# Patient Record
Sex: MALE | Race: WHITE | Employment: UNEMPLOYED | RURAL
[De-identification: names, ages, dates, MRNs, and addresses within clinical notes are randomized per-mention and may not be internally consistent; named-entity substitution may affect disease eponyms.]

---

## 2017-01-23 ENCOUNTER — HOSPITAL ENCOUNTER (OUTPATIENT)
Dept: OTHER | Age: 52
Discharge: HOME | End: 2017-01-23
Attending: INTERNAL MEDICINE | Admitting: INTERNAL MEDICINE

## 2017-01-25 LAB — TESTOSTERONE TOTAL-MALE: 258 NG/DL (ref 300–890)

## 2017-02-01 ENCOUNTER — HOSPITAL ENCOUNTER (OUTPATIENT)
Dept: MRI IMAGING | Age: 52
Discharge: HOME | End: 2017-02-01
Attending: PAIN MEDICINE | Admitting: PAIN MEDICINE

## 2017-02-01 DIAGNOSIS — M54.12 RADICULOPATHY, CERVICAL: ICD-10-CM

## 2017-02-01 DIAGNOSIS — M54.12 RADICULOPATHY OF CERVICAL REGION: ICD-10-CM

## 2019-01-14 NOTE — PROGRESS NOTES
Encounter Date:01/17/2019    [facility]    Patient ID: Charly Braun is a 53 y.o. male    1965  Subjective:      Chief Complaint/Reason for visit:    Chief Complaint   Patient presents with   • Chest Pain   • Irregular Heart Beat       Problem List:  1. Palpitations  2. Hypertension  3. GERD  4. Depression    HPI:  Patient is a pleasant 53-year-old male with the above-noted medical history presents today for further evaluation of his palpitations.  He states that he has had intermittent palpitations for majority of his life but they are typically brief in duration and very random.  A few weeks ago, he began noticing worsening palpitations at night lasting longer in duration up to several hours.  He cannot pinpoint any obvious trigger.  He states they occur consistently over a couple of days and then he has not experienced any in the past week.  He had labs performed by his primary which included normal magnesium and TSH levels.  He also states that his primary care had originally set him up for a sleep evaluation but could not afford the cost.  He states that he does not sleep well at night.  He has random intermittent chest pains that occur without trigger lasting seconds in duration.  He does have a family history of early onset coronary disease with 3 of his brothers and his father.  He reports having normal stress test and echocardiogram 20+ years ago.  He states when he is active he does not have any chest pain or shortness of breath that is limiting to daily life.  He has never been a smoker and he does not have diabetes or hyperlipidemia.  Since his palpitations seem to come in spurts, we think a monitor would be the patient's best option to determine what kind of rhythm and he is experiencing.  In order has been written and given to him and he will present to [facility] when they should occur again.        Cardiac ROS:  Positive for palpitations and snoring,  random chest pain.  Negative shortness of breath, dyspnea on exertion, orthopnea, PND, fatigue, edema, dizziness, pre-syncope/ syncope, BRYNN    Cardiac Risk Factors: family history of premature cardiovascular disease, hypertension, male gender, obesity (BMI >= 30 kg/m2) and sedentary lifestyle  Social History     Socioeconomic History   • Marital status:      Spouse name: Not on file   • Number of children: Not on file   • Years of education: Not on file   • Highest education level: Not on file   Social Needs   • Financial resource strain: Not on file   • Food insecurity - worry: Not on file   • Food insecurity - inability: Not on file   • Transportation needs - medical: Not on file   • Transportation needs - non-medical: Not on file   Occupational History   • Not on file   Tobacco Use   • Smoking status: Current Some Day Smoker   • Smokeless tobacco: Never Used   Substance and Sexual Activity   • Alcohol use: No     Frequency: Never   • Drug use: No   • Sexual activity: Defer   Other Topics Concern   • Not on file   Social History Narrative   • Not on file       family history includes COPD in his father; Hypertension in his mother.     has a past medical history of Acid reflux, Arthritis, Depression, Essential hypertension (1/17/2019), and Hypertension.    No Known Allergies      Current Outpatient Medications:   •  FLUoxetine (PROzac) 40 MG capsule, Take 40 mg by mouth Daily., Disp: , Rfl:   •  fluticasone (FLONASE) 50 MCG/ACT nasal spray, 2 sprays into the nostril(s) as directed by provider Daily., Disp: , Rfl:   •  HYDROcodone-acetaminophen (NORCO) 7.5-325 MG per tablet, Take 1 tablet by mouth Every 6 (Six) Hours As Needed for Moderate Pain ., Disp: , Rfl:   •  lisinopril (PRINIVIL,ZESTRIL) 10 MG tablet, Take 10 mg by mouth Daily., Disp: , Rfl:   •  pantoprazole (PROTONIX) 20 MG EC tablet, Take 20 mg by mouth Daily., Disp: , Rfl:     Review of Systems   Constitution: Negative.   HENT: Negative.    Eyes:  Negative.    Cardiovascular: Positive for chest pain and palpitations.   Respiratory: Positive for snoring.    Endocrine: Negative.    Hematologic/Lymphatic: Negative.    Skin: Negative.    Musculoskeletal: Negative.    Gastrointestinal: Positive for heartburn.   Genitourinary: Negative.    Neurological: Negative.    Psychiatric/Behavioral: Negative.    Allergic/Immunologic: Negative.        Vitals:    01/17/19 1205   BP: 118/62   Pulse: 77          Objective:       Physical Exam   Constitutional: He is oriented to person, place, and time. He appears well-developed and well-nourished.   HENT:   Head: Normocephalic and atraumatic.   Eyes: Pupils are equal, round, and reactive to light. Right eye exhibits no discharge. Left eye exhibits no discharge.   Neck: Normal range of motion. Neck supple. No JVD present.   Cardiovascular: Normal rate, regular rhythm, normal heart sounds and intact distal pulses. Exam reveals no gallop and no friction rub.   No murmur heard.  Pulmonary/Chest: Effort normal and breath sounds normal. He has no wheezes.   Abdominal: Soft. Bowel sounds are normal. There is no rebound.   Musculoskeletal: Normal range of motion. He exhibits no edema.   Neurological: He is alert and oriented to person, place, and time.   Skin: Skin is warm and dry.   Psychiatric: He has a normal mood and affect. His behavior is normal.       Data Review:     ECG 12 Lead  Date/Time: 1/17/2019 1:02 PM  Performed by: Angela Torres APRN  Authorized by: Angela Torres APRN   Comparison: not compared with previous ECG   Previous ECG: no previous ECG available  Rhythm: sinus rhythm  Rate: normal  BPM: 77  Comments: Nonspecific T-wave abnormality  Borderline EKG        Assessment:      ICD-10-CM ICD-9-CM   1. Palpitations R00.2 785.1   2. Essential hypertension I10 401.9       Plan:  48 hour holter monitor.  Order given.  When palpitations return, he will come to asim and [facility] with his order to have it placed.    Continue current medications  F/up in 6 weeks or sooner if needed.  Can push out further if monitor has not been worn yet.          Scribed for Karen Garcia MD by MARGOT Lara. 1/17/2019  1:05 PM     I Karen Garcia MD personally performed the services described in this documentation as scribed by the above individual in my presence, and it is both accurate and complete.    Karen Garcia MD, FACC

## 2019-01-17 ENCOUNTER — CONSULT (OUTPATIENT)
Dept: CARDIOLOGY | Facility: CLINIC | Age: 54
End: 2019-01-17

## 2019-01-17 ENCOUNTER — HOSPITAL ENCOUNTER (OUTPATIENT)
Facility: HOSPITAL | Age: 54
Discharge: HOME | End: 2019-01-17
Payer: MEDICARE

## 2019-01-17 VITALS
DIASTOLIC BLOOD PRESSURE: 62 MMHG | HEART RATE: 77 BPM | BODY MASS INDEX: 35.84 KG/M2 | SYSTOLIC BLOOD PRESSURE: 118 MMHG | HEIGHT: 71 IN | WEIGHT: 256 LBS

## 2019-01-17 DIAGNOSIS — R00.2 PALPITATIONS: Primary | ICD-10-CM

## 2019-01-17 DIAGNOSIS — I10 ESSENTIAL HYPERTENSION: ICD-10-CM

## 2019-01-17 PROCEDURE — 93000 ELECTROCARDIOGRAM COMPLETE: CPT | Performed by: INTERNAL MEDICINE

## 2019-01-17 PROCEDURE — 99204 OFFICE O/P NEW MOD 45 MIN: CPT | Performed by: INTERNAL MEDICINE

## 2019-01-17 PROCEDURE — 93005 ELECTROCARDIOGRAM TRACING: CPT

## 2019-01-17 RX ORDER — HYDROCODONE BITARTRATE AND ACETAMINOPHEN 7.5; 325 MG/1; MG/1
1 TABLET ORAL EVERY 6 HOURS PRN
COMMUNITY

## 2019-01-17 RX ORDER — FLUOXETINE HYDROCHLORIDE 40 MG/1
40 CAPSULE ORAL DAILY
COMMUNITY

## 2019-01-17 RX ORDER — PANTOPRAZOLE SODIUM 20 MG/1
20 TABLET, DELAYED RELEASE ORAL DAILY
COMMUNITY
End: 2022-09-15

## 2019-01-17 RX ORDER — FLUTICASONE PROPIONATE 50 MCG
2 SPRAY, SUSPENSION (ML) NASAL DAILY
COMMUNITY
End: 2022-03-24 | Stop reason: SDUPTHER

## 2019-01-17 RX ORDER — LISINOPRIL 10 MG/1
10 TABLET ORAL DAILY
COMMUNITY
End: 2022-09-15

## 2019-06-06 ENCOUNTER — OFFICE VISIT (OUTPATIENT)
Dept: CARDIOLOGY | Facility: CLINIC | Age: 54
End: 2019-06-06

## 2019-06-06 ENCOUNTER — HOSPITAL ENCOUNTER (OUTPATIENT)
Facility: HOSPITAL | Age: 54
Discharge: HOME | End: 2019-06-06
Payer: MEDICARE

## 2019-06-06 VITALS
WEIGHT: 257 LBS | HEIGHT: 71 IN | SYSTOLIC BLOOD PRESSURE: 146 MMHG | HEART RATE: 82 BPM | BODY MASS INDEX: 35.98 KG/M2 | DIASTOLIC BLOOD PRESSURE: 88 MMHG

## 2019-06-06 DIAGNOSIS — R00.2 PALPITATIONS: Primary | ICD-10-CM

## 2019-06-06 DIAGNOSIS — I10 ESSENTIAL HYPERTENSION: ICD-10-CM

## 2019-06-06 PROCEDURE — 99214 OFFICE O/P EST MOD 30 MIN: CPT | Performed by: INTERNAL MEDICINE

## 2019-06-06 PROCEDURE — 93225 XTRNL ECG REC<48 HRS REC: CPT

## 2019-06-06 PROCEDURE — 93226 XTRNL ECG REC<48 HR SCAN A/R: CPT

## 2019-06-06 RX ORDER — LORATADINE 10 MG/1
10 TABLET ORAL DAILY
Refills: 0 | COMMUNITY
Start: 2019-06-03 | End: 2022-09-15

## 2019-06-06 RX ORDER — IBUPROFEN 800 MG/1
800 TABLET ORAL EVERY 6 HOURS PRN
Refills: 0 | COMMUNITY
Start: 2019-06-03

## 2019-06-06 RX ORDER — PREDNISONE 10 MG/1
TABLET ORAL
Refills: 0 | COMMUNITY
Start: 2019-06-03 | End: 2019-06-13

## 2019-06-06 RX ORDER — GABAPENTIN 300 MG/1
300 CAPSULE ORAL AS NEEDED
Refills: 0 | Status: ON HOLD | COMMUNITY
Start: 2019-05-29 | End: 2019-06-24

## 2019-06-06 RX ORDER — AMOXICILLIN 500 MG/1
500 CAPSULE ORAL 3 TIMES DAILY
Refills: 0 | COMMUNITY
Start: 2019-06-03 | End: 2019-06-13

## 2019-06-12 ENCOUNTER — TELEPHONE (OUTPATIENT)
Dept: CARDIOLOGY | Facility: CLINIC | Age: 54
End: 2019-06-12

## 2019-06-12 NOTE — TELEPHONE ENCOUNTER
Lm on answering machine to call me back to go over monitor results- also left msg on spouses vm to have him call me as soon as possible    He needs an echo and either stress test or LHC- will await their returncall

## 2019-06-13 NOTE — TELEPHONE ENCOUNTER
Left another message on pt's answering machine to call me back to go over recent holter monitor. If no answer tomorrow, I will send him a letter.

## 2019-06-14 DIAGNOSIS — I47.20 V-TACH (HCC): Primary | ICD-10-CM

## 2019-06-14 NOTE — TELEPHONE ENCOUNTER
I talked with PT's wife and she is going to try to reach the patient to have him call me back- will await his return phone call

## 2019-06-14 NOTE — TELEPHONE ENCOUNTER
D/w pt and he would liek to proceed with [facility] over stress test- orders placed-

## 2019-06-18 ENCOUNTER — PREP FOR SURGERY (OUTPATIENT)
Dept: OTHER | Facility: HOSPITAL | Age: 54
End: 2019-06-18

## 2019-06-18 DIAGNOSIS — R00.2 PALPITATIONS: Primary | ICD-10-CM

## 2019-06-18 DIAGNOSIS — I47.20 V-TACH (HCC): Primary | ICD-10-CM

## 2019-06-18 RX ORDER — ASPIRIN 325 MG
325 TABLET ORAL ONCE
Status: CANCELLED | OUTPATIENT
Start: 2019-06-18 | End: 2019-06-18

## 2019-06-18 RX ORDER — ONDANSETRON 2 MG/ML
4 INJECTION INTRAMUSCULAR; INTRAVENOUS EVERY 6 HOURS PRN
Status: CANCELLED | OUTPATIENT
Start: 2019-06-18

## 2019-06-18 RX ORDER — ASPIRIN 325 MG
325 TABLET, DELAYED RELEASE (ENTERIC COATED) ORAL DAILY
Status: CANCELLED | OUTPATIENT
Start: 2019-06-19

## 2019-06-18 RX ORDER — SODIUM CHLORIDE 0.9 % (FLUSH) 0.9 %
3 SYRINGE (ML) INJECTION EVERY 12 HOURS SCHEDULED
Status: CANCELLED | OUTPATIENT
Start: 2019-06-18

## 2019-06-18 RX ORDER — NITROGLYCERIN 0.4 MG/1
0.4 TABLET SUBLINGUAL
Status: CANCELLED | OUTPATIENT
Start: 2019-06-18

## 2019-06-18 RX ORDER — SODIUM CHLORIDE 0.9 % (FLUSH) 0.9 %
1-10 SYRINGE (ML) INJECTION AS NEEDED
Status: CANCELLED | OUTPATIENT
Start: 2019-06-18

## 2019-06-24 ENCOUNTER — HOSPITAL ENCOUNTER (OUTPATIENT)
Facility: HOSPITAL | Age: 54
Discharge: HOME | End: 2019-06-24
Attending: INTERNAL MEDICINE | Admitting: INTERNAL MEDICINE

## 2019-06-24 ENCOUNTER — APPOINTMENT (OUTPATIENT)
Dept: CARDIOLOGY | Facility: HOSPITAL | Age: 54
End: 2019-06-24

## 2019-06-24 VITALS
RESPIRATION RATE: 16 BRPM | HEART RATE: 64 BPM | DIASTOLIC BLOOD PRESSURE: 80 MMHG | BODY MASS INDEX: 34.35 KG/M2 | HEIGHT: 71 IN | WEIGHT: 245.37 LBS | OXYGEN SATURATION: 94 % | TEMPERATURE: 97.7 F | SYSTOLIC BLOOD PRESSURE: 127 MMHG

## 2019-06-24 DIAGNOSIS — I47.20 V-TACH (HCC): ICD-10-CM

## 2019-06-24 LAB
ALBUMIN SERPL-MCNC: 4.4 G/DL (ref 3.5–5.2)
ALBUMIN/GLOB SERPL: 1.5 G/DL
ALP SERPL-CCNC: 52 U/L (ref 39–117)
ALT SERPL W P-5'-P-CCNC: 87 U/L (ref 1–41)
ANION GAP SERPL CALCULATED.3IONS-SCNC: 15 MMOL/L
AST SERPL-CCNC: 54 U/L (ref 1–40)
BH CV ECHO MEAS - AO ROOT AREA (BSA CORRECTED): 1.5
BH CV ECHO MEAS - AO ROOT AREA: 9.7 CM^2
BH CV ECHO MEAS - AO ROOT DIAM: 3.5 CM
BH CV ECHO MEAS - ASC AORTA: 3.1 CM
BH CV ECHO MEAS - BSA(HAYCOCK): 2.4 M^2
BH CV ECHO MEAS - BSA: 2.3 M^2
BH CV ECHO MEAS - BZI_BMI: 34.2 KILOGRAMS/M^2
BH CV ECHO MEAS - BZI_METRIC_HEIGHT: 180.3 CM
BH CV ECHO MEAS - BZI_METRIC_WEIGHT: 111.1 KG
BH CV ECHO MEAS - EDV(CUBED): 151.8 ML
BH CV ECHO MEAS - EDV(MOD-SP2): 58 ML
BH CV ECHO MEAS - EDV(MOD-SP4): 77 ML
BH CV ECHO MEAS - EDV(TEICH): 137.4 ML
BH CV ECHO MEAS - EF(CUBED): 75.4 %
BH CV ECHO MEAS - EF(MOD-BP): 63 %
BH CV ECHO MEAS - EF(MOD-SP2): 62.1 %
BH CV ECHO MEAS - EF(MOD-SP4): 61 %
BH CV ECHO MEAS - EF(TEICH): 66.9 %
BH CV ECHO MEAS - ESV(CUBED): 37.3 ML
BH CV ECHO MEAS - ESV(MOD-SP2): 22 ML
BH CV ECHO MEAS - ESV(MOD-SP4): 30 ML
BH CV ECHO MEAS - ESV(TEICH): 45.5 ML
BH CV ECHO MEAS - FS: 37.4 %
BH CV ECHO MEAS - IVS/LVPW: 0.98
BH CV ECHO MEAS - IVSD: 1.2 CM
BH CV ECHO MEAS - LA DIMENSION: 3.6 CM
BH CV ECHO MEAS - LA/AO: 1
BH CV ECHO MEAS - LAD MAJOR: 4.9 CM
BH CV ECHO MEAS - LAT PEAK E' VEL: 9.1 CM/SEC
BH CV ECHO MEAS - LATERAL E/E' RATIO: 8.3
BH CV ECHO MEAS - LV DIASTOLIC VOL/BSA (35-75): 33.5 ML/M^2
BH CV ECHO MEAS - LV MASS(C)D: 271.9 GRAMS
BH CV ECHO MEAS - LV MASS(C)DI: 118.3 GRAMS/M^2
BH CV ECHO MEAS - LV SYSTOLIC VOL/BSA (12-30): 13 ML/M^2
BH CV ECHO MEAS - LVIDD: 5.3 CM
BH CV ECHO MEAS - LVIDS: 3.3 CM
BH CV ECHO MEAS - LVLD AP2: 7.9 CM
BH CV ECHO MEAS - LVLD AP4: 7.1 CM
BH CV ECHO MEAS - LVLS AP2: 6.9 CM
BH CV ECHO MEAS - LVLS AP4: 6.9 CM
BH CV ECHO MEAS - LVPWD: 1.3 CM
BH CV ECHO MEAS - MED PEAK E' VEL: 6.7 CM/SEC
BH CV ECHO MEAS - MEDIAL E/E' RATIO: 11.2
BH CV ECHO MEAS - MV A MAX VEL: 65.2 CM/SEC
BH CV ECHO MEAS - MV DEC SLOPE: 447 CM/SEC^2
BH CV ECHO MEAS - MV DEC TIME: 0.16 SEC
BH CV ECHO MEAS - MV E MAX VEL: 77 CM/SEC
BH CV ECHO MEAS - MV E/A: 1.2
BH CV ECHO MEAS - PA ACC SLOPE: 583.9 CM/SEC^2
BH CV ECHO MEAS - PA ACC TIME: 0.14 SEC
BH CV ECHO MEAS - PA PR(ACCEL): 18 MMHG
BH CV ECHO MEAS - RVDD: 3.1 CM
BH CV ECHO MEAS - SI(CUBED): 49.8 ML/M^2
BH CV ECHO MEAS - SI(MOD-SP2): 15.7 ML/M^2
BH CV ECHO MEAS - SI(MOD-SP4): 20.4 ML/M^2
BH CV ECHO MEAS - SI(TEICH): 40 ML/M^2
BH CV ECHO MEAS - SV(CUBED): 114.5 ML
BH CV ECHO MEAS - SV(MOD-SP2): 36 ML
BH CV ECHO MEAS - SV(MOD-SP4): 47 ML
BH CV ECHO MEAS - SV(TEICH): 91.9 ML
BH CV ECHO MEASUREMENTS AVERAGE E/E' RATIO: 9.75
BH CV VAS BP RIGHT ARM: NORMAL MMHG
BH CV XLRA - RV BASE: 2.9 CM
BH CV XLRA - RV LENGTH: 5.1 CM
BH CV XLRA - RV MID: 2.1 CM
BILIRUB SERPL-MCNC: 0.7 MG/DL (ref 0.2–1.2)
BUN BLD-MCNC: 14 MG/DL (ref 6–20)
BUN BLDA-MCNC: 16 MG/DL (ref 8–26)
BUN/CREAT SERPL: 14.1 (ref 7–25)
CA-I BLDA-SCNC: 1.16 MMOL/L (ref 1.2–1.32)
CALCIUM SPEC-SCNC: 9.4 MG/DL (ref 8.6–10.5)
CHLORIDE BLDA-SCNC: 104 MMOL/L (ref 98–109)
CHLORIDE SERPL-SCNC: 103 MMOL/L (ref 98–107)
CHOLEST SERPL-MCNC: 170 MG/DL (ref 0–200)
CO2 BLDA-SCNC: 28 MMOL/L (ref 24–29)
CO2 SERPL-SCNC: 24 MMOL/L (ref 22–29)
CREAT BLD-MCNC: 0.99 MG/DL (ref 0.76–1.27)
CREAT BLDA-MCNC: 1 MG/DL (ref 0.6–1.3)
DEPRECATED RDW RBC AUTO: 42.5 FL (ref 37–54)
ERYTHROCYTE [DISTWIDTH] IN BLOOD BY AUTOMATED COUNT: 11.9 % (ref 12.3–15.4)
GFR SERPL CREATININE-BSD FRML MDRD: 79 ML/MIN/1.73
GLOBULIN UR ELPH-MCNC: 2.9 GM/DL
GLUCOSE BLD-MCNC: 98 MG/DL (ref 65–99)
GLUCOSE BLDC GLUCOMTR-MCNC: 97 MG/DL (ref 70–130)
HBA1C MFR BLD: 5.4 % (ref 4.8–5.6)
HCT VFR BLD AUTO: 47.1 % (ref 37.5–51)
HCT VFR BLDA CALC: 40 % (ref 38–51)
HDLC SERPL-MCNC: 36 MG/DL (ref 40–60)
HGB BLD-MCNC: 15.5 G/DL (ref 13–17.7)
HGB BLDA-MCNC: 13.6 G/DL (ref 12–17)
LDLC SERPL CALC-MCNC: 98 MG/DL (ref 0–100)
LDLC/HDLC SERPL: 2.72 {RATIO}
LEFT ATRIUM VOLUME INDEX: 15.2 ML/M^2
LEFT ATRIUM VOLUME: 35 ML
LV EF 2D ECHO EST: 60 %
MAXIMAL PREDICTED HEART RATE: 168 BPM
MCH RBC QN AUTO: 31.6 PG (ref 26.6–33)
MCHC RBC AUTO-ENTMCNC: 32.9 G/DL (ref 31.5–35.7)
MCV RBC AUTO: 96.1 FL (ref 79–97)
PLATELET # BLD AUTO: 166 10*3/MM3 (ref 140–450)
PMV BLD AUTO: 9.7 FL (ref 6–12)
POTASSIUM BLD-SCNC: 3.8 MMOL/L (ref 3.5–5.2)
POTASSIUM BLDA-SCNC: 4.3 MMOL/L (ref 3.5–4.9)
PROT SERPL-MCNC: 7.3 G/DL (ref 6–8.5)
RBC # BLD AUTO: 4.9 10*6/MM3 (ref 4.14–5.8)
SODIUM BLD-SCNC: 142 MMOL/L (ref 136–145)
SODIUM BLDA-SCNC: 141 MMOL/L (ref 138–146)
STRESS TARGET HR: 143 BPM
TRIGL SERPL-MCNC: 181 MG/DL (ref 0–150)
VLDLC SERPL-MCNC: 36.2 MG/DL
WBC NRBC COR # BLD: 5.1 10*3/MM3 (ref 3.4–10.8)

## 2019-06-24 PROCEDURE — 93306 TTE W/DOPPLER COMPLETE: CPT

## 2019-06-24 PROCEDURE — 93458 L HRT ARTERY/VENTRICLE ANGIO: CPT | Performed by: INTERNAL MEDICINE

## 2019-06-24 PROCEDURE — 80047 BASIC METABLC PNL IONIZED CA: CPT

## 2019-06-24 PROCEDURE — 0 IOPAMIDOL PER 1 ML: Performed by: INTERNAL MEDICINE

## 2019-06-24 PROCEDURE — 25010000002 FENTANYL CITRATE (PF) 100 MCG/2ML SOLUTION: Performed by: INTERNAL MEDICINE

## 2019-06-24 PROCEDURE — 80061 LIPID PANEL: CPT | Performed by: NURSE PRACTITIONER

## 2019-06-24 PROCEDURE — 36415 COLL VENOUS BLD VENIPUNCTURE: CPT

## 2019-06-24 PROCEDURE — 85014 HEMATOCRIT: CPT

## 2019-06-24 PROCEDURE — C1769 GUIDE WIRE: HCPCS | Performed by: INTERNAL MEDICINE

## 2019-06-24 PROCEDURE — 93306 TTE W/DOPPLER COMPLETE: CPT | Performed by: INTERNAL MEDICINE

## 2019-06-24 PROCEDURE — C1894 INTRO/SHEATH, NON-LASER: HCPCS | Performed by: INTERNAL MEDICINE

## 2019-06-24 PROCEDURE — 85027 COMPLETE CBC AUTOMATED: CPT | Performed by: NURSE PRACTITIONER

## 2019-06-24 PROCEDURE — 80053 COMPREHEN METABOLIC PANEL: CPT | Performed by: NURSE PRACTITIONER

## 2019-06-24 PROCEDURE — 83036 HEMOGLOBIN GLYCOSYLATED A1C: CPT | Performed by: NURSE PRACTITIONER

## 2019-06-24 PROCEDURE — 25010000002 MIDAZOLAM PER 1 MG: Performed by: INTERNAL MEDICINE

## 2019-06-24 RX ORDER — ALPRAZOLAM 0.25 MG/1
0.25 TABLET ORAL 3 TIMES DAILY PRN
Status: DISCONTINUED | OUTPATIENT
Start: 2019-06-24 | End: 2019-06-24 | Stop reason: HOSPADM

## 2019-06-24 RX ORDER — SODIUM CHLORIDE 9 MG/ML
250 INJECTION, SOLUTION INTRAVENOUS ONCE AS NEEDED
Status: DISCONTINUED | OUTPATIENT
Start: 2019-06-24 | End: 2019-06-24 | Stop reason: HOSPADM

## 2019-06-24 RX ORDER — LIDOCAINE HYDROCHLORIDE 10 MG/ML
INJECTION, SOLUTION EPIDURAL; INFILTRATION; INTRACAUDAL; PERINEURAL AS NEEDED
Status: DISCONTINUED | OUTPATIENT
Start: 2019-06-24 | End: 2019-06-24 | Stop reason: HOSPADM

## 2019-06-24 RX ORDER — NITROGLYCERIN 0.4 MG/1
0.4 TABLET SUBLINGUAL
Status: DISCONTINUED | OUTPATIENT
Start: 2019-06-24 | End: 2019-06-24 | Stop reason: HOSPADM

## 2019-06-24 RX ORDER — FENTANYL CITRATE 50 UG/ML
INJECTION, SOLUTION INTRAMUSCULAR; INTRAVENOUS AS NEEDED
Status: DISCONTINUED | OUTPATIENT
Start: 2019-06-24 | End: 2019-06-24 | Stop reason: HOSPADM

## 2019-06-24 RX ORDER — HYDROCODONE BITARTRATE AND ACETAMINOPHEN 7.5; 325 MG/1; MG/1
1 TABLET ORAL EVERY 4 HOURS PRN
Status: DISCONTINUED | OUTPATIENT
Start: 2019-06-24 | End: 2019-06-24 | Stop reason: HOSPADM

## 2019-06-24 RX ORDER — SODIUM CHLORIDE 0.9 % (FLUSH) 0.9 %
3 SYRINGE (ML) INJECTION EVERY 12 HOURS SCHEDULED
Status: DISCONTINUED | OUTPATIENT
Start: 2019-06-24 | End: 2019-06-24 | Stop reason: HOSPADM

## 2019-06-24 RX ORDER — SODIUM CHLORIDE 0.9 % (FLUSH) 0.9 %
1-10 SYRINGE (ML) INJECTION AS NEEDED
Status: DISCONTINUED | OUTPATIENT
Start: 2019-06-24 | End: 2019-06-24 | Stop reason: HOSPADM

## 2019-06-24 RX ORDER — ONDANSETRON 2 MG/ML
4 INJECTION INTRAMUSCULAR; INTRAVENOUS EVERY 6 HOURS PRN
Status: DISCONTINUED | OUTPATIENT
Start: 2019-06-24 | End: 2019-06-24 | Stop reason: HOSPADM

## 2019-06-24 RX ORDER — MIDAZOLAM HYDROCHLORIDE 1 MG/ML
INJECTION INTRAMUSCULAR; INTRAVENOUS AS NEEDED
Status: DISCONTINUED | OUTPATIENT
Start: 2019-06-24 | End: 2019-06-24 | Stop reason: HOSPADM

## 2019-06-24 RX ORDER — ACETAMINOPHEN 325 MG/1
650 TABLET ORAL EVERY 4 HOURS PRN
Status: DISCONTINUED | OUTPATIENT
Start: 2019-06-24 | End: 2019-06-24 | Stop reason: HOSPADM

## 2019-06-24 RX ORDER — BISOPROLOL FUMARATE 5 MG/1
5 TABLET, FILM COATED ORAL DAILY
Qty: 30 TABLET | Refills: 11 | Status: SHIPPED | OUTPATIENT
Start: 2019-06-24 | End: 2020-03-05 | Stop reason: SDUPTHER

## 2019-06-24 RX ORDER — NALOXONE HCL 0.4 MG/ML
0.4 VIAL (ML) INJECTION
Status: DISCONTINUED | OUTPATIENT
Start: 2019-06-24 | End: 2019-06-24 | Stop reason: HOSPADM

## 2019-06-24 RX ORDER — ASPIRIN 325 MG
325 TABLET ORAL ONCE
Status: COMPLETED | OUTPATIENT
Start: 2019-06-24 | End: 2019-06-24

## 2019-06-24 RX ORDER — SODIUM CHLORIDE 9 MG/ML
330 INJECTION, SOLUTION INTRAVENOUS CONTINUOUS
Status: ACTIVE | OUTPATIENT
Start: 2019-06-24 | End: 2019-06-24

## 2019-06-24 RX ORDER — SODIUM CHLORIDE 9 MG/ML
100 INJECTION, SOLUTION INTRAVENOUS CONTINUOUS
Status: ACTIVE | OUTPATIENT
Start: 2019-06-24 | End: 2019-06-24

## 2019-06-24 RX ORDER — ASPIRIN 325 MG
325 TABLET, DELAYED RELEASE (ENTERIC COATED) ORAL DAILY
Status: DISCONTINUED | OUTPATIENT
Start: 2019-06-25 | End: 2019-06-24 | Stop reason: HOSPADM

## 2019-06-24 RX ORDER — MORPHINE SULFATE 2 MG/ML
1 INJECTION, SOLUTION INTRAMUSCULAR; INTRAVENOUS EVERY 4 HOURS PRN
Status: DISCONTINUED | OUTPATIENT
Start: 2019-06-24 | End: 2019-06-24 | Stop reason: HOSPADM

## 2019-06-24 RX ADMIN — ASPIRIN 325 MG ORAL TABLET 325 MG: 325 PILL ORAL at 08:01

## 2019-06-24 RX ADMIN — SODIUM CHLORIDE 330 ML/HR: 9 INJECTION, SOLUTION INTRAVENOUS at 08:01

## 2019-06-24 NOTE — INTERVAL H&P NOTE
H&P updated. The patient was examined and the following changes are noted:  Multiple PVCs were seen as separate as well as several runs of brief nonsustained VT the longest of which was 6 beats.  The patient now presents for left heart catheterization with possible intervention.  The right radial pulse as well as the ulnar pulse is good.  The patient has no upcoming surgeries.  The patient understands the risks and benefits of the procedure and agrees to proceed.  A sleep study will also be pursued as well as an echocardiogram.Karen Garcia MD, FACC

## 2019-08-15 ENCOUNTER — OFFICE VISIT (OUTPATIENT)
Dept: CARDIOLOGY | Facility: CLINIC | Age: 54
End: 2019-08-15

## 2019-08-15 VITALS
WEIGHT: 253 LBS | BODY MASS INDEX: 35.42 KG/M2 | SYSTOLIC BLOOD PRESSURE: 122 MMHG | HEIGHT: 71 IN | DIASTOLIC BLOOD PRESSURE: 78 MMHG | HEART RATE: 74 BPM

## 2019-08-15 DIAGNOSIS — R00.2 PALPITATIONS: Primary | ICD-10-CM

## 2019-08-15 DIAGNOSIS — I10 ESSENTIAL HYPERTENSION: ICD-10-CM

## 2019-08-15 PROCEDURE — 99214 OFFICE O/P EST MOD 30 MIN: CPT | Performed by: NURSE PRACTITIONER

## 2019-08-15 NOTE — PROGRESS NOTES
Charly Braun  1965  54 y.o.  [phone]  Mobile phone not available      Date: 08/15/2019    PCP: [facility] Foster APRN    Chief Complaint   Patient presents with   • Palpitations       Problem List:  1. Palpitation:  a. 48h Holter, 06/10/2019: Rare PAC's, occasional PVC's, one 6-beat run of VT.  b. Normal LHC, 06/24/2019.  c. Echo, 06/24/2019: EF 60%. Trace-to-mild MR/TR.  2. Hypertension  3. GERD  4. Depression      No Known Allergies    Current Medications:      Current Outpatient Medications:   •  bisoprolol (ZEBETA) 5 MG tablet, Take 1 tablet by mouth Daily., Disp: 30 tablet, Rfl: 11  •  FLUoxetine (PROzac) 40 MG capsule, Take 40 mg by mouth Daily., Disp: , Rfl:   •  fluticasone (FLONASE) 50 MCG/ACT nasal spray, 2 sprays into the nostril(s) as directed by provider Daily., Disp: , Rfl:   •  HYDROcodone-acetaminophen (NORCO) 7.5-325 MG per tablet, Take 1 tablet by mouth Every 6 (Six) Hours As Needed for Moderate Pain ., Disp: , Rfl:   •  ibuprofen (ADVIL,MOTRIN) 800 MG tablet, Take 800 mg by mouth As Needed., Disp: , Rfl: 0  •  lisinopril (PRINIVIL,ZESTRIL) 10 MG tablet, Take 10 mg by mouth Daily., Disp: , Rfl:   •  loratadine (CLARITIN) 10 MG tablet, Take 10 mg by mouth Daily., Disp: , Rfl: 0  •  pantoprazole (PROTONIX) 20 MG EC tablet, Take 20 mg by mouth Daily., Disp: , Rfl:     HPI    Charly Braun is a 54 y.o. male who presents today for 1 month hospital follow up s/p cath with a history of palpitations and hypertension. Since last visit, he has been doing well from the cardiac standpoint.  He states since adding bisoprolol to his medical therapy his palpitations have nearly stopped completely.  He denies having any chest pain, shortness of breath, dyspnea on exertion, edema, fatigue, dizziness and syncope.  Overall he is doing much better from a cardiac standpoint since his normal cardiac catheterization and medication adjustment.  He does note that he forgot to get his initial sleep study  "duration and has rescheduled that appointment.      The following portions of the patient's history were reviewed and updated as appropriate: allergies, current medications and problem list.    Pertinent positives as listed in the HPI.  All other systems reviewed are negative.    Vitals:    08/15/19 1522   BP: 122/78   BP Location: Right arm   Patient Position: Sitting   Pulse: 74   Weight: 115 kg (253 lb)   Height: 180.3 cm (71\")       Physical Exam:    GENERAL: well-developed, well-nourished; in no acute distress.   NECK:  Carotid upstrokes are 2+ and  symmetrical without bruits.   LUNGS: Clear to auscultation bilaterally without wheezing, rhonchi, or rales noted.   CARDIOVASCULAR: The heart has a regular rate with a normal S1 and S2. There is no murmur, gallop, rub, or click appreciated. The PMI is nondisplaced.   NEUROLOGICAL: Nonfocal; Alert and oriented  PERIPHERAL VASCULAR:  Posterior tibial pulses are 2+ and symmetrical. There is no peripheral edema.   SKIN:  Warm and dry  PSYCHIATRIC: normal mood and affect; behavior appropriate      Diagnostic Data:  Lab Results   Component Value Date    GLUCOSE 98 06/24/2019    BUN 14 06/24/2019    CREATININE 1.00 06/24/2019    EGFRIFNONA 79 06/24/2019    BCR 14.1 06/24/2019     06/24/2019    K 3.8 06/24/2019     06/24/2019    CO2 24.0 06/24/2019    CALCIUM 9.4 06/24/2019    ALBUMIN 4.40 06/24/2019    AST 54 (H) 06/24/2019    ALT 87 (H) 06/24/2019     Lab Results   Component Value Date    CHOL 170 06/24/2019    TRIG 181 (H) 06/24/2019    HDL 36 (L) 06/24/2019    LDL 98 06/24/2019      Lab Results   Component Value Date    WBC 5.10 06/24/2019    HGB 13.6 06/24/2019    HCT 40 06/24/2019    MCV 96.1 06/24/2019     06/24/2019     No results found for: TSH    Procedures    Assessment:      ICD-10-CM ICD-9-CM   1. Palpitations R00.2 785.1   2. Essential hypertension I10 401.9     Lab results found above were reviewed with the " patient.    Plan:    1. Encouraged routine exercise and dietary modifications  2. Continue bisoprolol for hypertension and palpitations  3. Continue all other current medications.  4. F/up in 6 months or sooner if needed.    Seen independently by MARGOT Lara on  8/16/2019  10:33 PM

## 2019-08-31 ENCOUNTER — HOSPITAL ENCOUNTER (EMERGENCY)
Facility: HOSPITAL | Age: 54
Discharge: HOME | End: 2019-08-31
Attending: EMERGENCY MEDICINE
Payer: MEDICARE

## 2019-08-31 VITALS
SYSTOLIC BLOOD PRESSURE: 121 MMHG | RESPIRATION RATE: 16 BRPM | BODY MASS INDEX: 34.27 KG/M2 | HEART RATE: 77 BPM | TEMPERATURE: 98.7 F | DIASTOLIC BLOOD PRESSURE: 77 MMHG | HEIGHT: 72 IN | WEIGHT: 253 LBS | OXYGEN SATURATION: 96 %

## 2019-08-31 DIAGNOSIS — L02.91 ABSCESS: Primary | ICD-10-CM

## 2019-08-31 PROCEDURE — 99282 EMERGENCY DEPT VISIT SF MDM: CPT

## 2019-08-31 RX ORDER — CLINDAMYCIN HYDROCHLORIDE 300 MG/1
300 CAPSULE ORAL 3 TIMES DAILY
Qty: 30 CAPSULE | Refills: 0 | Status: SHIPPED | OUTPATIENT
Start: 2019-08-31 | End: 2019-09-10

## 2019-08-31 ASSESSMENT — PAIN DESCRIPTION - DESCRIPTORS: DESCRIPTORS: THROBBING

## 2019-08-31 ASSESSMENT — ENCOUNTER SYMPTOMS
ABDOMINAL PAIN: 0
EYE REDNESS: 0
SORE THROAT: 0
ROS SKIN COMMENTS: ABSCESS.
SHORTNESS OF BREATH: 0
EYE PAIN: 0
CONSTIPATION: 0
EYE DISCHARGE: 0
VOMITING: 0
RHINORRHEA: 0
COUGH: 0
TROUBLE SWALLOWING: 0
BACK PAIN: 0
DIARRHEA: 0
SINUS PRESSURE: 0
CHEST TIGHTNESS: 0
NAUSEA: 0
WHEEZING: 0

## 2019-08-31 ASSESSMENT — PAIN DESCRIPTION - PAIN TYPE: TYPE: ACUTE PAIN

## 2019-08-31 ASSESSMENT — PAIN DESCRIPTION - LOCATION: LOCATION: BACK;SHOULDER

## 2019-08-31 ASSESSMENT — PAIN DESCRIPTION - ORIENTATION: ORIENTATION: LEFT;UPPER

## 2019-08-31 ASSESSMENT — PAIN DESCRIPTION - FREQUENCY: FREQUENCY: CONTINUOUS

## 2019-08-31 ASSESSMENT — PAIN SCALES - GENERAL: PAINLEVEL_OUTOF10: 8

## 2019-08-31 NOTE — ED PROVIDER NOTES
allergies. FAMILY HISTORY     History reviewed. No pertinent family history. SOCIAL HISTORY       Social History     Socioeconomic History    Marital status:      Spouse name: None    Number of children: None    Years of education: None    Highest education level: None   Occupational History    None   Social Needs    Financial resource strain: None    Food insecurity:     Worry: None     Inability: None    Transportation needs:     Medical: None     Non-medical: None   Tobacco Use    Smoking status: Never Smoker    Smokeless tobacco: Never Used   Substance and Sexual Activity    Alcohol use: Not Currently    Drug use: None    Sexual activity: None   Lifestyle    Physical activity:     Days per week: None     Minutes per session: None    Stress: None   Relationships    Social connections:     Talks on phone: None     Gets together: None     Attends Jew service: None     Active member of club or organization: None     Attends meetings of clubs or organizations: None     Relationship status: None    Intimate partner violence:     Fear of current or ex partner: None     Emotionally abused: None     Physically abused: None     Forced sexual activity: None   Other Topics Concern    None   Social History Narrative    None       SCREENINGS             PHYSICAL EXAM    (up to 7 for level 4, 8 or more for level 5)     ED Triage Vitals [08/31/19 1605]   BP Temp Temp Source Pulse Resp SpO2 Height Weight   123/87 98.7 °F (37.1 °C) Oral 75 16 98 % 6' (1.829 m) 253 lb (114.8 kg)       Physical Exam   Constitutional: He is oriented to person, place, and time. He appears well-developed and well-nourished. HENT:   Head: Normocephalic and atraumatic. Eyes: Pupils are equal, round, and reactive to light. Conjunctivae and EOM are normal.   Neck: Neck supple. Cardiovascular: Normal rate and regular rhythm. Pulmonary/Chest: Effort normal and breath sounds normal.   Abdominal: Soft.  Bowel sounds are normal.   Musculoskeletal: Normal range of motion. Neurological: He is alert and oriented to person, place, and time. Skin: Skin is warm and dry. Draining abscess to left upper back, tender on palpation. Psychiatric: He has a normal mood and affect. DIAGNOSTIC RESULTS     EKG: All EKG's are interpreted by the Emergency Department Physician who either signs or Co-signs this chart in the absence of a cardiologist.        RADIOLOGY:   Non-plain film images such as CT, Ultrasound and MRI are read by the radiologist. Plain radiographic images are visualized andpreliminarily interpreted by the emergency physician with the below findings:        Interpretationper the Radiologist below, if available at the time of this note:    No orders to display         ED BEDSIDE ULTRASOUND:   Performed by ED Physician - none    LABS:  Labs Reviewed - No data to display    All other labs were within normal range or not returned as of this dictation. EMERGENCY DEPARTMENT COURSE and DIFFERENTIAL DIAGNOSIS/MDM:   Vitals:    Vitals:    08/31/19 1605   BP: 123/87   Pulse: 75   Resp: 16   Temp: 98.7 °F (37.1 °C)   TempSrc: Oral   SpO2: 98%   Weight: 253 lb (114.8 kg)   Height: 6' (1.829 m)           CRITICAL CARE TIME   Total Critical Care time was  minutes, excluding separatelyreportable procedures. There was a high probability ofclinically significant/life threatening deterioration in the patient's condition which required my urgent intervention. CONSULTS:  None    PROCEDURES:  None    PROGRESS NOTES:        FINAL IMPRESSION      1.  Abscess          Final diagnoses:   Abscess       DISPOSITION/PLAN   DISPOSITION Decision To Discharge 08/31/2019 05:00:42 PM      PATIENT REFERRED TO:  [address]  [address]  Manoj Quach  [phone]      If symptoms worsen      DISCHARGE MEDICATIONS:  New Prescriptions    CLINDAMYCIN (CLEOCIN) 300 MG CAPSULE    Take 1 capsule by mouth 3

## 2019-09-03 ENCOUNTER — HOSPITAL ENCOUNTER (EMERGENCY)
Facility: HOSPITAL | Age: 54
Discharge: HOME | End: 2019-09-03
Attending: EMERGENCY MEDICINE
Payer: MEDICARE

## 2019-09-03 VITALS
HEART RATE: 99 BPM | WEIGHT: 253 LBS | DIASTOLIC BLOOD PRESSURE: 80 MMHG | OXYGEN SATURATION: 98 % | HEIGHT: 72 IN | RESPIRATION RATE: 20 BRPM | TEMPERATURE: 98.6 F | BODY MASS INDEX: 34.27 KG/M2 | SYSTOLIC BLOOD PRESSURE: 127 MMHG

## 2019-09-03 DIAGNOSIS — L02.91 ABSCESS: Primary | ICD-10-CM

## 2019-09-03 PROCEDURE — 87070 CULTURE OTHR SPECIMN AEROBIC: CPT

## 2019-09-03 PROCEDURE — 87205 SMEAR GRAM STAIN: CPT

## 2019-09-03 PROCEDURE — 87077 CULTURE AEROBIC IDENTIFY: CPT

## 2019-09-03 PROCEDURE — 87186 SC STD MICRODIL/AGAR DIL: CPT

## 2019-09-03 PROCEDURE — 10061 I&D ABSCESS COMP/MULTIPLE: CPT

## 2019-09-03 PROCEDURE — 99282 EMERGENCY DEPT VISIT SF MDM: CPT

## 2019-09-03 ASSESSMENT — ENCOUNTER SYMPTOMS
NAUSEA: 0
SHORTNESS OF BREATH: 0
WHEEZING: 0
EYE PAIN: 0
CHEST TIGHTNESS: 0
TROUBLE SWALLOWING: 0
EYE DISCHARGE: 0
RHINORRHEA: 0
BACK PAIN: 0
SORE THROAT: 0
EYE REDNESS: 0
SINUS PRESSURE: 0
DIARRHEA: 0
VOMITING: 0
COUGH: 0
CONSTIPATION: 0
ABDOMINAL PAIN: 0
ROS SKIN COMMENTS: ABSCESS.

## 2019-09-03 ASSESSMENT — PAIN DESCRIPTION - ONSET: ONSET: ON-GOING

## 2019-09-03 ASSESSMENT — PAIN DESCRIPTION - PROGRESSION: CLINICAL_PROGRESSION: GRADUALLY WORSENING

## 2019-09-03 ASSESSMENT — PAIN DESCRIPTION - PAIN TYPE: TYPE: ACUTE PAIN

## 2019-09-03 ASSESSMENT — PAIN DESCRIPTION - FREQUENCY: FREQUENCY: CONTINUOUS

## 2019-09-03 ASSESSMENT — PAIN DESCRIPTION - LOCATION: LOCATION: SHOULDER;BACK

## 2019-09-03 ASSESSMENT — PAIN DESCRIPTION - ORIENTATION: ORIENTATION: LEFT

## 2019-09-03 ASSESSMENT — PAIN SCALES - GENERAL: PAINLEVEL_OUTOF10: 7

## 2019-09-03 NOTE — ED PROVIDER NOTES
[address]  eMERGENCY dEPARTMENT eNCOUnter      Pt Name: Jennifer Carney  MRN: [id number]  [place] 1965  Date of evaluation: 9/3/2019  Provider: Demetris Calhoun MD    CHIEF COMPLAINT       Chief Complaint   Patient presents with    Abscess         HISTORY OF PRESENT ILLNESS   (Location/Symptom, Timing/Onset, Context/Setting, Quality, Duration, Modifying Factors, Severity)  Note limiting factors. Jennifer Carney is a 48 y.o. male who presents to the emergency department because of abscess to left upper back. Patient seen in ED 2 days ago and started on antibiotics but no relief. Nursing Notes were reviewed. REVIEW OF SYSTEMS    (2-9 systems for level 4, 10 or more forlevel 5)     Review of Systems   Constitutional: Negative for chills and fever. HENT: Negative for congestion, ear pain, postnasal drip, rhinorrhea, sinus pressure, sneezing, sore throat and trouble swallowing. Eyes: Negative for pain, discharge and redness. Respiratory: Negative for cough, chest tightness, shortness of breath and wheezing. Cardiovascular: Negative for chest pain, palpitations and leg swelling. Gastrointestinal: Negative for abdominal pain, constipation, diarrhea, nausea and vomiting. Genitourinary: Negative for dysuria, flank pain, frequency, hematuria and urgency. Musculoskeletal: Negative for back pain, joint swelling and neck pain. Skin: Negative for pallor and rash. Abscess. Neurological: Negative for dizziness, syncope, weakness, numbness and headaches. Psychiatric/Behavioral: Negative for confusion and hallucinations. The patient is not nervous/anxious.             PAST MEDICAL HISTORY     Past Medical History:   Diagnosis Date    Hypertension          SURGICAL HISTORY       Past Surgical History:   Procedure Laterality Date    CARDIAC CATHETERIZATION           CURRENT MEDICATIONS       Previous Medications    CLINDAMYCIN (CLEOCIN) 300 MG CAPSULE

## 2019-09-05 ENCOUNTER — HOSPITAL ENCOUNTER (EMERGENCY)
Facility: HOSPITAL | Age: 54
Discharge: HOME | End: 2019-09-05
Attending: EMERGENCY MEDICINE
Payer: MEDICARE

## 2019-09-05 VITALS
DIASTOLIC BLOOD PRESSURE: 82 MMHG | OXYGEN SATURATION: 95 % | RESPIRATION RATE: 20 BRPM | TEMPERATURE: 98.3 F | SYSTOLIC BLOOD PRESSURE: 120 MMHG | HEART RATE: 80 BPM

## 2019-09-05 DIAGNOSIS — Z09 ENCOUNTER FOR RECHECK OF ABSCESS FOLLOWING INCISION AND DRAINAGE: Primary | ICD-10-CM

## 2019-09-05 PROCEDURE — 99282 EMERGENCY DEPT VISIT SF MDM: CPT

## 2019-09-05 ASSESSMENT — PAIN DESCRIPTION - PAIN TYPE: TYPE: ACUTE PAIN

## 2019-09-05 ASSESSMENT — ENCOUNTER SYMPTOMS
HEMATOCHEZIA: 0
EYE REDNESS: 0
NAUSEA: 0
ABDOMINAL PAIN: 0
ORTHOPNEA: 0
EYE DISCHARGE: 0
VOMITING: 0
DIARRHEA: 0
BACK PAIN: 0
COUGH: 0
HEMOPTYSIS: 0
SHORTNESS OF BREATH: 0
SPUTUM PRODUCTION: 0
STRIDOR: 0
HEMATEMESIS: 0

## 2019-09-05 ASSESSMENT — PAIN DESCRIPTION - ONSET: ONSET: GRADUAL

## 2019-09-05 ASSESSMENT — PAIN SCALES - GENERAL: PAINLEVEL_OUTOF10: 4

## 2019-09-05 ASSESSMENT — PAIN DESCRIPTION - FREQUENCY: FREQUENCY: CONTINUOUS

## 2019-09-05 ASSESSMENT — PAIN DESCRIPTION - DESCRIPTORS: DESCRIPTORS: ACHING

## 2019-09-05 ASSESSMENT — PAIN DESCRIPTION - PROGRESSION: CLINICAL_PROGRESSION: GRADUALLY IMPROVING

## 2019-09-05 ASSESSMENT — PAIN DESCRIPTION - LOCATION: LOCATION: SHOULDER

## 2019-09-05 ASSESSMENT — PAIN DESCRIPTION - ORIENTATION: ORIENTATION: LEFT

## 2019-09-06 LAB
GRAM STAIN RESULT: ABNORMAL
ORGANISM: ABNORMAL
WOUND/ABSCESS: ABNORMAL

## 2019-12-29 ENCOUNTER — APPOINTMENT (OUTPATIENT)
Dept: GENERAL RADIOLOGY | Facility: HOSPITAL | Age: 54
End: 2019-12-29
Payer: MEDICARE

## 2019-12-29 ENCOUNTER — HOSPITAL ENCOUNTER (EMERGENCY)
Facility: HOSPITAL | Age: 54
Discharge: HOME | End: 2019-12-29
Attending: FAMILY MEDICINE
Payer: MEDICARE

## 2019-12-29 VITALS
BODY MASS INDEX: 35 KG/M2 | OXYGEN SATURATION: 94 % | HEART RATE: 93 BPM | WEIGHT: 250 LBS | HEIGHT: 71 IN | SYSTOLIC BLOOD PRESSURE: 124 MMHG | RESPIRATION RATE: 16 BRPM | DIASTOLIC BLOOD PRESSURE: 67 MMHG | TEMPERATURE: 100.5 F

## 2019-12-29 LAB
RAPID INFLUENZA  B AGN: NEGATIVE
RAPID INFLUENZA A AGN: POSITIVE

## 2019-12-29 PROCEDURE — 87804 INFLUENZA ASSAY W/OPTIC: CPT

## 2019-12-29 PROCEDURE — 71046 X-RAY EXAM CHEST 2 VIEWS: CPT

## 2019-12-29 PROCEDURE — 99283 EMERGENCY DEPT VISIT LOW MDM: CPT

## 2019-12-29 RX ORDER — OSELTAMIVIR PHOSPHATE 75 MG/1
75 CAPSULE ORAL 2 TIMES DAILY
Qty: 10 CAPSULE | Refills: 0 | Status: SHIPPED | OUTPATIENT
Start: 2019-12-29 | End: 2020-01-03

## 2019-12-29 RX ORDER — HYDROCODONE BITARTRATE AND ACETAMINOPHEN 10; 325 MG/1; MG/1
1 TABLET ORAL EVERY 6 HOURS PRN
Qty: 12 TABLET | Refills: 0 | Status: SHIPPED | OUTPATIENT
Start: 2019-12-29 | End: 2020-01-28

## 2019-12-29 ASSESSMENT — ENCOUNTER SYMPTOMS
DIARRHEA: 0
SHORTNESS OF BREATH: 0
VOMITING: 0
NAUSEA: 0
COUGH: 1

## 2019-12-29 ASSESSMENT — PAIN DESCRIPTION - LOCATION: LOCATION: BACK;GENERALIZED

## 2019-12-29 ASSESSMENT — PAIN SCALES - GENERAL: PAINLEVEL_OUTOF10: 4

## 2019-12-29 ASSESSMENT — PAIN - FUNCTIONAL ASSESSMENT: PAIN_FUNCTIONAL_ASSESSMENT: 0-10

## 2019-12-29 ASSESSMENT — PAIN DESCRIPTION - PAIN TYPE: TYPE: ACUTE PAIN;CHRONIC PAIN

## 2019-12-29 ASSESSMENT — PAIN DESCRIPTION - DESCRIPTORS: DESCRIPTORS: ACHING

## 2019-12-29 NOTE — ED NOTES
Reviewed discharge plan with Destin Landis. Encouraged him to f/u with Betito Hoff and he understood. NAD noted on discharge, gait steady. Reviewed discharge prescription for:     Current Discharge Medication List      START taking these medications    Details   oseltamivir (TAMIFLU) 75 MG capsule Take 1 capsule by mouth 2 times daily for 5 days  Qty: 10 capsule, Refills: 0    Associated Diagnoses: Influenza with respiratory manifestation other than pneumonia      HYDROcodone-acetaminophen (NORCO)  MG per tablet Take 1 tablet by mouth every 6 hours as needed for Pain for up to 30 days. Intended supply: 30 days  Qty: 12 tablet, Refills: 0    Associated Diagnoses: Influenza with respiratory manifestation other than pneumonia             Jasbir Coyle states understanding of how and when to take medications.       Electronically signed by Debby Hinojosa RN on 12/29/2019 at 11:02 AM     Debby Hinojosa RN  12/29/19 [id number]

## 2019-12-29 NOTE — ED PROVIDER NOTES
[address]  eMERGENCY dEPARTMENT eNCOUnter      Pt Name: Nena Rojas  MRN: [id number]  [place] 1965  Date of evaluation: 12/29/2019  Provider: René Moore MD    [address] [place] [address]       Chief Complaint   Patient presents with    Cough         HISTORY OF PRESENT ILLNESS   (Location/Symptom, Timing/Onset, Context/Setting, Quality, Duration, Modifying Factors, Severity)  Note limiting factors. Nena Rojas is a 48 y.o. male who presents to the emergency department with a 2 day history of congestion, nonproductive cough, fever chills and body aches. Nursing Notes were reviewed. REVIEW OF SYSTEMS    (2-9 systems for level 4, 10 or more forlevel 5)     Review of Systems   Constitutional: Positive for chills and fever. HENT: Positive for congestion. Respiratory: Positive for cough. Negative for shortness of breath. Cardiovascular: Negative for chest pain. Gastrointestinal: Negative for diarrhea, nausea and vomiting. Musculoskeletal: Positive for arthralgias and myalgias. Except as noted above the remainder of the review of systems was reviewed and negative. PAST MEDICAL HISTORY     Past Medical History:   Diagnosis Date    Hypertension          SURGICAL HISTORY       Past Surgical History:   Procedure Laterality Date    CARDIAC CATHETERIZATION           CURRENT MEDICATIONS       Previous Medications    No medications on file       ALLERGIES     Patient has no known allergies. FAMILY HISTORY     History reviewed. No pertinent family history.        SOCIAL HISTORY       Social History     Socioeconomic History    Marital status:      Spouse name: None    Number of children: None    Years of education: None    Highest education level: None   Occupational History    None   Social Needs    Financial resource strain: None    Food insecurity:     Worry: None     Inability: None    Transportation needs:     Medical: None Non-medical: None   Tobacco Use    Smoking status: Never Smoker    Smokeless tobacco: Never Used   Substance and Sexual Activity    Alcohol use: Not Currently    Drug use: None    Sexual activity: None   Lifestyle    Physical activity:     Days per week: None     Minutes per session: None    Stress: None   Relationships    Social connections:     Talks on phone: None     Gets together: None     Attends Protestant service: None     Active member of club or organization: None     Attends meetings of clubs or organizations: None     Relationship status: None    Intimate partner violence:     Fear of current or ex partner: None     Emotionally abused: None     Physically abused: None     Forced sexual activity: None   Other Topics Concern    None   Social History Narrative    None       SCREENINGS             PHYSICAL EXAM    (up to 7 for level 4, 8 or more for level 5)     ED Triage Vitals   BP Temp Temp Source Pulse Resp SpO2 Height Weight   12/29/19 1027 12/29/19 1025 12/29/19 1025 12/29/19 1027 12/29/19 1027 12/29/19 1027 12/29/19 1025 12/29/19 1025   125/71 100.5 °F (38.1 °C) Oral 99 22 96 % 5' 11\" (1.803 m) 250 lb (113.4 kg)       Physical Exam  Vitals signs and nursing note reviewed. HENT:      Mouth/Throat:      Mouth: Mucous membranes are moist.      Pharynx: Posterior oropharyngeal erythema present. Eyes:      Conjunctiva/sclera: Conjunctivae normal.   Neck:      Musculoskeletal: Normal range of motion and neck supple. Cardiovascular:      Rate and Rhythm: Normal rate and regular rhythm. Pulmonary:      Effort: Pulmonary effort is normal.      Breath sounds: Normal breath sounds. Abdominal:      General: Bowel sounds are normal.      Palpations: Abdomen is soft. Tenderness: There is no tenderness. Neurological:      Mental Status: He is alert.          DIAGNOSTIC RESULTS     EKG: All EKG's are interpreted by the Emergency Department Physician who either signs or Co-signs this chart in the absence of a cardiologist.        RADIOLOGY:   Non-plain film images such as CT, Ultrasound and MRI are read by the radiologist. Plainradiographic images are visualized and preliminarily interpreted by the emergency physician with the below findings:        Interpretation per the Radiologist below, if available at the time of this note:    XR CHEST STANDARD (2 VW)    (Results Pending)         ED BEDSIDE ULTRASOUND:   Performed by ED Physician - none    LABS:  Labs Reviewed   RAPID INFLUENZA A/B ANTIGENS - Abnormal; Notable for the following components:       Result Value    Rapid Influenza A Ag POSITIVE (*)     All other components within normal limits    Narrative:     Performed at:  [address] [place] [address] Laboratory  [address] [facility],  [place], [place] [address] [address]   Phone [phone]       All other labs were within normal range or not returned as of this dictation. EMERGENCY DEPARTMENT COURSE and DIFFERENTIALDIAGNOSIS/MDM:   Vitals:    Vitals:    12/29/19 1025 12/29/19 1027   BP:  125/71   Pulse:  99   Resp:  22   Temp: 100.5 °F (38.1 °C)    TempSrc: Oral    SpO2:  96%   Weight: 250 lb (113.4 kg)    Height: 5' 11\" (1.803 m)            CRITICALCARE TIME   Total Critical Care time was 0 minutes, excludingseparately reportable procedures. There was a high probabilityof clinically significant/life threatening deterioration in the patient's condition which required my urgent intervention. CONSULTS:  None    PROCEDURES:  None    FINAL IMPRESSION      1.  Influenza with respiratory manifestation other than pneumonia        DISPOSITION/PLAN   DISPOSITION Decision To Discharge 12/29/2019 10:49:15 AM      PATIENT REFERRED TO:  Jadiel Martinez [address] [place] [address]  Manoj Quach  [phone]      If symptoms worsen      DISCHARGE MEDICATIONS:  New Prescriptions    HYDROCODONE-ACETAMINOPHEN (NORCO)  MG PER TABLET    Take 1 tablet by mouth every 6 hours as needed for Pain for up to 30 days.  Intended supply: 30 days    OSELTAMIVIR (TAMIFLU) 75 MG CAPSULE    Take 1 capsule by mouth 2 times daily for 5 days       (Please note that portions ofthis note were completed with a voice recognition program.  Efforts were made to edit the dictations but occasionally words are mis-transcribed.)    Olivia Mahoney MD(electronically signed)  Attending Emergency Physician          Olivia Mahoney MD  12/29/19 2152

## 2020-03-05 ENCOUNTER — OFFICE VISIT (OUTPATIENT)
Dept: CARDIOLOGY | Facility: CLINIC | Age: 55
End: 2020-03-05

## 2020-03-05 VITALS
SYSTOLIC BLOOD PRESSURE: 122 MMHG | BODY MASS INDEX: 35.16 KG/M2 | WEIGHT: 259.6 LBS | HEIGHT: 72 IN | HEART RATE: 85 BPM | DIASTOLIC BLOOD PRESSURE: 68 MMHG | OXYGEN SATURATION: 99 %

## 2020-03-05 DIAGNOSIS — E66.01 MORBIDLY OBESE (HCC): ICD-10-CM

## 2020-03-05 DIAGNOSIS — R00.2 PALPITATIONS: Primary | ICD-10-CM

## 2020-03-05 DIAGNOSIS — I47.29 NSVT (NONSUSTAINED VENTRICULAR TACHYCARDIA) (HCC): ICD-10-CM

## 2020-03-05 DIAGNOSIS — I49.3 PVC (PREMATURE VENTRICULAR CONTRACTION): ICD-10-CM

## 2020-03-05 PROBLEM — I47.20 V-TACH: Status: RESOLVED | Noted: 2019-06-14 | Resolved: 2020-03-05

## 2020-03-05 PROCEDURE — 99213 OFFICE O/P EST LOW 20 MIN: CPT | Performed by: INTERNAL MEDICINE

## 2020-03-05 RX ORDER — BISOPROLOL FUMARATE 5 MG/1
5 TABLET, FILM COATED ORAL DAILY
Qty: 90 TABLET | Refills: 3 | Status: SHIPPED | OUTPATIENT
Start: 2020-03-05 | End: 2021-03-18 | Stop reason: SDUPTHER

## 2020-03-05 NOTE — PROGRESS NOTES
[facility] Card[facility]    Patient ID: Charly Braun is a 54 y.o.   male.  : 1965   Contact: [phone]    Encounter date: 2020    PCP: Марина Foster APRN      Chief complaint:   Chief Complaint   Patient presents with   • Palpitations     Problem List:  1. Palpitations:  a. 48h Holter, 06/10/2019: Rare PAC's, occasional PVC's, one 6-beat run of VT.  b. Normal LHC, 2019.  c. Echo, 2019: EF 60%. Trace-to-mild MR/TR.  2. Hypertension  3. GERD  4. Depression    No Known Allergies    Current Medications:    Current Outpatient Medications:   •  bisoprolol (ZEBETA) 5 MG tablet, Take 1 tablet by mouth Daily., Disp: 30 tablet, Rfl: 11  •  FLUoxetine (PROzac) 40 MG capsule, Take 40 mg by mouth Daily., Disp: , Rfl:   •  fluticasone (FLONASE) 50 MCG/ACT nasal spray, 2 sprays into the nostril(s) as directed by provider Daily., Disp: , Rfl:   •  HYDROcodone-acetaminophen (NORCO) 7.5-325 MG per tablet, Take 1 tablet by mouth Every 6 (Six) Hours As Needed for Moderate Pain ., Disp: , Rfl:   •  ibuprofen (ADVIL,MOTRIN) 800 MG tablet, Take 800 mg by mouth As Needed., Disp: , Rfl: 0  •  lisinopril (PRINIVIL,ZESTRIL) 10 MG tablet, Take 10 mg by mouth Daily., Disp: , Rfl:   •  loratadine (CLARITIN) 10 MG tablet, Take 10 mg by mouth Daily., Disp: , Rfl: 0  •  pantoprazole (PROTONIX) 20 MG EC tablet, Take 20 mg by mouth Daily., Disp: , Rfl:     HPI    Charly Braun is a 54 y.o. male who presents today for 6 month follow up of palpitations and hypertension. Since last visit, he had done well from cardiovascular standpoint. Occ palpitations, much improved on Bisoprolol. BP controlled. No LH, dizziness, near syncope or syncope.       The following portions of the patient's history were reviewed and updated as appropriate: allergies, current medications and problem list.    Pertinent positives as listed in the HPI.  All other systems reviewed are  "negative.         Vitals:    03/05/20 1006   BP: 122/68   BP Location: Right arm   Patient Position: Sitting   Pulse: 85   SpO2: 99%   Weight: 118 kg (259 lb 9.6 oz)   Height: 182.9 cm (72\")       Physical Exam:  General: Alert and oriented.  Neck: Jugular venous pressure is within normal limits. Carotids have normal upstrokes without bruits.   Cardiovascular: Heart has a nondisplaced focal PMI. Regular rate and rhythm. No murmur, gallop or rub.  Lungs: Clear, no rales or wheezes. Equal expansion is noted.   Extremities: Show no edema.  Skin: Warm and dry.  Neurologic: Nonfocal.     Diagnostic Data (reviewed with patient):  Lab Results   Component Value Date    GLUCOSE 98 06/24/2019    BUN 14 06/24/2019    CREATININE 1.00 06/24/2019    EGFRIFNONA 79 06/24/2019    BCR 14.1 06/24/2019     06/24/2019    K 3.8 06/24/2019     06/24/2019    CO2 24.0 06/24/2019    CALCIUM 9.4 06/24/2019    ALBUMIN 4.40 06/24/2019    ALKPHOS 52 06/24/2019    AST 54 (H) 06/24/2019    ALT 87 (H) 06/24/2019     Lab Results   Component Value Date    CHOL 170 06/24/2019    TRIG 181 (H) 06/24/2019    HDL 36 (L) 06/24/2019    LDL 98 06/24/2019      Lab Results   Component Value Date    WBC 5.10 06/24/2019    RBC 4.90 06/24/2019    HGB 13.6 06/24/2019    HCT 40 06/24/2019    MCV 96.1 06/24/2019     06/24/2019          Procedures      Assessment:    ICD-10-CM ICD-9-CM   1. Palpitations R00.2 785.1   2. Morbidly obese (CMS/HCC) E66.01 278.01   3. NSVT (nonsustained ventricular tachycardia) (CMS/[facility]) I47.2 427.1   4. PVC (premature ventricular contraction) I49.3 427.69         Plan:  1. Continue Bisoprolol for PVCs/NSVT.   2. Continue all other current medications.  3. F/up in 12 months, sooner if needed.      Electronically signed by MARGOT Barnard, 03/05/20, 10:21 AM.              "

## 2020-03-12 ENCOUNTER — APPOINTMENT (OUTPATIENT)
Dept: CARDIOLOGY | Facility: HOSPITAL | Age: 55
End: 2020-03-12

## 2020-09-14 ENCOUNTER — HOSPITAL ENCOUNTER (OUTPATIENT)
Dept: MRI IMAGING | Facility: HOSPITAL | Age: 55
Discharge: HOME | End: 2020-09-14
Payer: MEDICARE

## 2020-09-14 PROCEDURE — 72141 MRI NECK SPINE W/O DYE: CPT

## 2021-03-18 ENCOUNTER — OFFICE VISIT (OUTPATIENT)
Dept: CARDIOLOGY | Facility: CLINIC | Age: 56
End: 2021-03-18

## 2021-03-18 VITALS
HEIGHT: 72 IN | WEIGHT: 254.2 LBS | OXYGEN SATURATION: 98 % | BODY MASS INDEX: 34.43 KG/M2 | DIASTOLIC BLOOD PRESSURE: 86 MMHG | HEART RATE: 77 BPM | SYSTOLIC BLOOD PRESSURE: 140 MMHG

## 2021-03-18 DIAGNOSIS — I10 ESSENTIAL HYPERTENSION: ICD-10-CM

## 2021-03-18 DIAGNOSIS — I47.29 NSVT (NONSUSTAINED VENTRICULAR TACHYCARDIA) (HCC): ICD-10-CM

## 2021-03-18 DIAGNOSIS — I49.3 PVC (PREMATURE VENTRICULAR CONTRACTION): ICD-10-CM

## 2021-03-18 DIAGNOSIS — E66.01 MORBIDLY OBESE (HCC): ICD-10-CM

## 2021-03-18 DIAGNOSIS — R00.2 PALPITATIONS: Primary | ICD-10-CM

## 2021-03-18 PROCEDURE — 99214 OFFICE O/P EST MOD 30 MIN: CPT | Performed by: INTERNAL MEDICINE

## 2021-03-18 RX ORDER — BISOPROLOL FUMARATE 5 MG/1
TABLET, FILM COATED ORAL
Qty: 135 TABLET | Refills: 3 | Status: SHIPPED | OUTPATIENT
Start: 2021-03-18 | End: 2022-04-04

## 2021-03-18 NOTE — PROGRESS NOTES
[facility] Cardiology    Patient ID: Charly Braun is a 55 y.o. male.  : 1965   Contact: [phone]    Encounter date: 2021    PCP: Марина Foster APRN      Chief complaint:   Chief Complaint   Patient presents with   • Palpitations       Problem List:  1. Palpitations:  a. 48h Holter, 06/10/2019: Rare PAC's, occasional PVC's, one 6-beat run of VT.  b. Normal LHC, 2019.  c. Echo, 2019: EF 60%. Trace-to-mild MR/TR.  2. Hypertension  3. GERD  4. Depression    No Known Allergies    Current Medications:    Current Outpatient Medications:   •  bisoprolol (ZEBETA) 5 MG tablet, Take 1 tablet by mouth Daily., Disp: 90 tablet, Rfl: 3  •  FLUoxetine (PROzac) 40 MG capsule, Take 40 mg by mouth Daily., Disp: , Rfl:   •  fluticasone (FLONASE) 50 MCG/ACT nasal spray, 2 sprays into the nostril(s) as directed by provider Daily., Disp: , Rfl:   •  HYDROcodone-acetaminophen (NORCO) 7.5-325 MG per tablet, Take 1 tablet by mouth Every 6 (Six) Hours As Needed for Moderate Pain ., Disp: , Rfl:   •  ibuprofen (ADVIL,MOTRIN) 800 MG tablet, Take 800 mg by mouth As Needed., Disp: , Rfl: 0  •  lisinopril (PRINIVIL,ZESTRIL) 10 MG tablet, Take 10 mg by mouth Daily., Disp: , Rfl:   •  loratadine (CLARITIN) 10 MG tablet, Take 10 mg by mouth Daily., Disp: , Rfl: 0  •  pantoprazole (PROTONIX) 20 MG EC tablet, Take 20 mg by mouth Daily., Disp: , Rfl:     HPI    Charly Braun is a 55 y.o. male who presents today for an annual follow up of palpitations, NSVT, and PVC. Since last visit, he occasionally checks his BP at home and notes that it is occasionally lower than what was measured in the office today. Recently he has been experiencing palpitations and notices them when he sits down. These episodes occur several times per day. He lost 20 pounds several months ago but has regained the weight. Patient otherwise denies chest pain, shortness of breath, PND, edema, syncope, or  "presyncope at this time.       The following portions of the patient's history were reviewed and updated as appropriate: allergies, current medications and problem list.    Pertinent positives as listed in the HPI.  All other systems reviewed are negative.         Vitals:    03/18/21 0942   BP: 140/86   Pulse: 77   SpO2: 98%   Weight: 115 kg (254 lb 3.2 oz)   Height: 182.9 cm (72\")       Physical Exam:  General: Alert and oriented.  Neck: Jugular venous pressure is within normal limits. Carotids have normal upstrokes without bruits.   Cardiovascular: Heart has a nondisplaced focal PMI. Regular rate and rhythm. No murmur, gallop or rub.  Lungs: Clear, no rales or wheezes. Equal expansion is noted.   Extremities: Show no edema.  Skin: Warm and dry.  Neurologic: Nonfocal.     Diagnostic Data (reviewed with patient):    Lab Results   Component Value Date    CHOL 170 06/24/2019    TRIG 181 (H) 06/24/2019    HDL 36 (L) 06/24/2019    LDL 98 06/24/2019        Procedures    Advance Care Planning   < 66 yo        Assessment:    ICD-10-CM ICD-9-CM   1. Palpitations  R00.2 785.1   2. NSVT (nonsustained ventricular tachycardia) (CMS/[facility])  I47.2 427.1   3. PVC (premature ventricular contraction)  I49.3 427.69   4. Essential hypertension  I10 401.9         Plan:  1. Increase bisoprolol 5mg from 1 tab to 1.5 tabs per day for better control of palpitations   2. Begin routine aerobic exercise for at least 3 minutes 5 days per week.  3. Continue lisinopril 10mg for hypertension.   4. Continue all other current medications.  5. F/up in 12 months, sooner if needed.    Scribed for Karen Garcia MD by Maco Macias. 3/18/2021  09:54 EDT       I Karen Garcia MD personally performed the services described in this documentation as scribed by the above individual in my presence, and it is both accurate and complete.    Karen Garcia MD, FACC              "

## 2021-10-20 ENCOUNTER — PROCEDURE VISIT (OUTPATIENT)
Dept: PULMONOLOGY | Age: 56
End: 2021-10-20
Payer: MEDICARE

## 2021-10-20 ENCOUNTER — HOSPITAL ENCOUNTER (OUTPATIENT)
Dept: SLEEP CENTER | Facility: HOSPITAL | Age: 56
Discharge: HOME | End: 2021-10-22
Payer: MEDICARE

## 2021-10-20 VITALS
BODY MASS INDEX: 35.21 KG/M2 | HEART RATE: 71 BPM | SYSTOLIC BLOOD PRESSURE: 131 MMHG | WEIGHT: 260 LBS | RESPIRATION RATE: 16 BRPM | HEIGHT: 72 IN | OXYGEN SATURATION: 92 % | TEMPERATURE: 98.3 F | DIASTOLIC BLOOD PRESSURE: 81 MMHG

## 2021-10-20 DIAGNOSIS — G47.00 INSOMNIA, UNSPECIFIED TYPE: Primary | ICD-10-CM

## 2021-10-20 PROCEDURE — 95810 POLYSOM 6/> YRS 4/> PARAM: CPT

## 2021-10-20 RX ORDER — HYDROCODONE BITARTRATE AND ACETAMINOPHEN 7.5; 325 MG/1; MG/1
1 TABLET ORAL EVERY 12 HOURS PRN
COMMUNITY

## 2021-10-20 RX ORDER — BISOPROLOL FUMARATE 5 MG/1
5 TABLET ORAL DAILY
COMMUNITY

## 2021-10-20 RX ORDER — IBUPROFEN 800 MG/1
800 TABLET ORAL 2 TIMES DAILY PRN
COMMUNITY
End: 2021-12-10 | Stop reason: ALTCHOICE

## 2021-10-20 RX ORDER — FLUTICASONE PROPIONATE 50 MCG
2 SPRAY, SUSPENSION (ML) NASAL DAILY
COMMUNITY

## 2021-10-20 RX ORDER — SILDENAFIL 50 MG/1
50 TABLET, FILM COATED ORAL PRN
COMMUNITY

## 2021-10-20 ASSESSMENT — SLEEP AND FATIGUE QUESTIONNAIRES
HOW LIKELY ARE YOU TO NOD OFF OR FALL ASLEEP WHILE SITTING AND TALKING TO SOMEONE: 0
WHEN DID CURRENT SLEEP PROBLEMS START: 62000
NUMBER OF TIMES YOU WAKE PER NIGHT: 4
HOW LIKELY ARE YOU TO NOD OFF OR FALL ASLEEP WHILE SITTING INACTIVE IN A PUBLIC PLACE: 2
DO YOU SNORE: YES
HOW LIKELY ARE YOU TO NOD OFF OR FALL ASLEEP IN A CAR, WHILE STOPPED FOR A FEW MINUTES IN TRAFFIC: 1
HOW LIKELY ARE YOU TO NOD OFF OR FALL ASLEEP WHEN YOU ARE A PASSENGER IN A CAR FOR AN HOUR WITHOUT A BREAK: 3
HOW OFTEN DO YOU SNORE: 1-2 TIMES A WEEK
HAS ANYONE NOTICED THAT YOU QUIT BREATHING DURING SLEEP: 1-2 TIMES A WEEK
ESS TOTAL SCORE: 17
HOW LIKELY ARE YOU TO NOD OFF OR FALL ASLEEP WHILE SITTING AND READING: 3
WHAT TIME DO YOU USUALLY GO TO BED: 82800
HOW LIKELY ARE YOU TO NOD OFF OR FALL ASLEEP WHILE SITTING QUIETLY AFTER LUNCH WITHOUT ALCOHOL: 2
DOES YOUR SNORING BOTHER OTHERS: YES
WHAT TIME DO YOU USUALLY WAKE UP: 25200
HOW MANY NAPS DO YOU TAKE PER WEEK: 2
I DO OR HAVE BEEN TOLD I SNORE DURING SLEEP: YES, AS AN ADULT OR ADOLESCENT
NORMAL AMOUNT OF SLEEP PER NIGHT: 4
FOR THE FIRST 30 MINUTES AFTER WAKING, I AM: SOMEWHAT DROWSY
SNORING VOLUME: AS LOUD AS TALKING
USUAL AMOUNT OF TIME TO FALL ASLEEP (MIN): 30
HOW LIKELY ARE YOU TO NOD OFF OR FALL ASLEEP WHILE LYING DOWN TO REST IN THE AFTERNOON WHEN CIRCUMSTANCES PERMIT: 3
ARE YOU TIRED AFTER SLEEPING: ALMOST DAILY
I SLEEP WELL: NO
ARE YOU TIRED DURING WAKE TIME: ALMOST DAILY
HOW LIKELY ARE YOU TO NOD OFF OR FALL ASLEEP WHILE WATCHING TV: 3

## 2021-10-28 ENCOUNTER — PROCEDURE VISIT (OUTPATIENT)
Dept: PULMONOLOGY | Age: 56
End: 2021-10-28
Payer: MEDICARE

## 2021-10-28 DIAGNOSIS — G47.00 INSOMNIA, UNSPECIFIED TYPE: Primary | ICD-10-CM

## 2021-10-28 NOTE — PROGRESS NOTES
Interpretation:     1. Mild obstructive sleep apnea. 2. Obesity. 3. Subjective hypersomnia. Recommendations:    1. Consider Auto CPAP to titrate between 8cm water pressure and 20cm water pressure. 2. Weight loss and exercise. 3. Avoid risky activity such as driving if sleepy. 4. Discuss sleep hygiene with the patient. 5. Monitor PAP use and effectiveness.        Jamin Calixto MD, [facility], [facility]

## 2021-11-02 PROBLEM — R00.2 PALPITATIONS: Status: ACTIVE | Noted: 2019-01-17

## 2021-11-02 PROBLEM — I10 ESSENTIAL HYPERTENSION: Status: ACTIVE | Noted: 2019-01-17

## 2021-11-02 PROBLEM — I49.3 PVC (PREMATURE VENTRICULAR CONTRACTION): Status: ACTIVE | Noted: 2020-03-05

## 2021-11-02 PROBLEM — I47.29 NSVT (NONSUSTAINED VENTRICULAR TACHYCARDIA) (HCC): Status: ACTIVE | Noted: 2020-03-05

## 2021-11-02 PROBLEM — E66.01 MORBIDLY OBESE (HCC): Status: ACTIVE | Noted: 2020-03-05

## 2021-11-02 NOTE — PROGRESS NOTES
Interpretation:     1. Mild obstructive sleep apnea. 2. Poor sleep efficiency and maintenance. 3. Obesity. 4. Subjective hypersomnia. Recommendations:    1. Consider Auto CPAP to titrate between 8cm water pressure and 20cm water pressure. 2. Weight loss and exercise. 3. Avoid risky activity such as driving if sleepy. 4. Discuss sleep hygiene with the patient. 5. Monitor PAP use and effectiveness.        Jamin Calixto MD, FCCP, [facility]

## 2021-11-06 PROCEDURE — 95810 POLYSOM 6/> YRS 4/> PARAM: CPT | Performed by: INTERNAL MEDICINE

## 2021-12-07 ENCOUNTER — OFFICE VISIT (OUTPATIENT)
Dept: PULMONOLOGY | Facility: CLINIC | Age: 56
End: 2021-12-07

## 2021-12-07 VITALS
HEART RATE: 83 BPM | HEIGHT: 72 IN | BODY MASS INDEX: 35.21 KG/M2 | WEIGHT: 260 LBS | RESPIRATION RATE: 16 BRPM | SYSTOLIC BLOOD PRESSURE: 128 MMHG | DIASTOLIC BLOOD PRESSURE: 78 MMHG | OXYGEN SATURATION: 98 %

## 2021-12-07 DIAGNOSIS — R06.02 SHORTNESS OF BREATH: ICD-10-CM

## 2021-12-07 DIAGNOSIS — G47.33 OBSTRUCTIVE SLEEP APNEA: Primary | ICD-10-CM

## 2021-12-07 DIAGNOSIS — R06.83 SNORING: ICD-10-CM

## 2021-12-07 DIAGNOSIS — J45.40 MODERATE PERSISTENT ASTHMA WITHOUT COMPLICATION: ICD-10-CM

## 2021-12-07 DIAGNOSIS — G47.19 EXCESSIVE DAYTIME SLEEPINESS: ICD-10-CM

## 2021-12-07 PROCEDURE — 99204 OFFICE O/P NEW MOD 45 MIN: CPT | Performed by: INTERNAL MEDICINE

## 2021-12-07 PROCEDURE — 95012 NITRIC OXIDE EXP GAS DETER: CPT | Performed by: INTERNAL MEDICINE

## 2021-12-07 RX ORDER — ALBUTEROL SULFATE 90 UG/1
2 AEROSOL, METERED RESPIRATORY (INHALATION) EVERY 4 HOURS PRN
Qty: 18 G | Refills: 5 | Status: SHIPPED | OUTPATIENT
Start: 2021-12-07 | End: 2022-03-24 | Stop reason: SDUPTHER

## 2021-12-07 NOTE — PROGRESS NOTES
CONSULT NOTE    Requested by:   Марина Foster APRN      Chief Complaint   Patient presents with   • Consult   • Sleeping Problem       Subjective:  Charly Braun is a 56 y.o. male.     History of Present Illness   Patient came in today for evaluation of possible sleep apnea. Patient says that for the past few years he snores loudly and has woken up in the middle of the night gasping for breath and sometimes with a choking sensation. Also, the patient's family notes that he has occasional pauses in the breathing.     he feels that he doesn't get restful night sleep and his quality has diminished considerably. he does feel sleepy watching TV and reading a book.      he is complaining of occasional headaches. Patient mentions having rare nights when he has nightmares. he act out his dreams.     There is no known family history of sleep apnea.     he drinks 3-4 cups/cans of caffeinated drinks per day.    He is complaining of shortness of breath for the past few years    The patient says that his shortness of breath is more pronounced during the change of seasons. It is also occasionally associated with wheezing. Patient says that he occasionally has a cough that usually follow activity & seasonal changes.    The patient has pets, dog and cats, at home. There seems to be a seasonal variation to the symptoms.    The patient does have a family history of asthma/COPD, in his father.     The patient denies a history of smoking.    He used to work in construction and also welding.     Patient says that he has been using his nasal sprays on a regular basis and describes no significant ongoing issues other than occasional congestion.       The following portions of the patient's history were reviewed and updated as appropriate: allergies, current medications, past family history, past medical history, past social history and past surgical history.    Review of Systems   Constitutional: Negative for chills, fatigue and  "fever.   HENT: Positive for sinus pressure, sneezing and sore throat.    Respiratory: Positive for cough and chest tightness. Negative for shortness of breath and wheezing.    Cardiovascular: Negative for palpitations and leg swelling.   Psychiatric/Behavioral: Positive for sleep disturbance.   All other systems reviewed and are negative.      Past Medical History:   Diagnosis Date   • Acid reflux    • Arthritis    • Back pain    • Depression    • Essential hypertension 1/17/2019   • GERD (gastroesophageal reflux disease)    • Hypertension        Social History     Tobacco Use   • Smoking status: Never Smoker   • Smokeless tobacco: Never Used   Substance Use Topics   • Alcohol use: No         Objective:  Visit Vitals  /78   Pulse 83   Resp 16   Ht 182.9 cm (72.01\")   Wt 118 kg (260 lb)   SpO2 98%   BMI 35.25 kg/m²       Physical Exam  Vitals reviewed.   Constitutional:       Appearance: He is well-developed.   Neck:      Vascular: No JVD.   Cardiovascular:      Rate and Rhythm: Normal rate and regular rhythm.   Pulmonary:      Effort: Pulmonary effort is normal.      Breath sounds: No wheezing or rales.      Comments: Normal to percussion.  Mild scattered wheezing noted.   Musculoskeletal:      Cervical back: Neck supple.      Comments: Gait was normal.   Skin:     General: Skin is warm and dry.   Neurological:      Mental Status: He is alert and oriented to person, place, and time.   Psychiatric:         Behavior: Behavior normal.         Assessment/Plan:  Diagnoses and all orders for this visit:    1. Obstructive sleep apnea (Primary)  -     BIPAP / CPAP Adjustment    2. Snoring  -     BIPAP / CPAP Adjustment    3. Excessive daytime sleepiness  -     BIPAP / CPAP Adjustment    4. Shortness of breath  -     Pulmonary Function Test; Future  -     Nitric Oxide  -     Peak Flow    5. Moderate persistent asthma without complication  -     Pulmonary Function Test; Future  -     Nitric Oxide  -     Peak " Flow    Other orders  -     Fluticasone Furoate-Vilanterol (Breo Ellipta) 100-25 MCG/INH inhaler; Inhale 1 puff Daily. Rinse mouth with water after use.  Dispense: 60 each; Refill: 5  -     albuterol sulfate HFA (Ventolin HFA) 108 (90 Base) MCG/ACT inhaler; Inhale 2 puffs Every 4 (Four) Hours As Needed for Wheezing or Shortness of Air.  Dispense: 18 g; Refill: 5        Return in about 15 weeks (around 3/22/2022) for Recheck, PFT F/U, For Darlene Landis), ....Also 8 mths w/ Dr. Agustin.    DISCUSSION(if any):  I have also reviewed his provider's office note that mentions hypertension and sleep disorder.     I also reviewed his last echocardiogram and shared the results with him.   Results for orders placed during the hospital encounter of 06/24/19    Adult Transthoracic Echo Complete W/ Cont if Necessary Per Protocol    Interpretation Summary  · Estimated EF = 60%.  · Left ventricular systolic function is normal.  · Trace to mild mitral and tricuspid regurgitation      ===========================  ===========================    PFTs were ordered for follow up visit.     FeNO level was 24 today.    Peak flow was 490 LPM today.    Laboratory workup was also reviewed which showed   Lab Results   Component Value Date    HGB 13.6 06/24/2019    HGB 15.5 06/24/2019     Laboratory workup also showed   Lab Results   Component Value Date    CO2 24.0 06/24/2019     ===========================  ===========================    I was able to review his last sleep study, from Oct 2021.  It showed that the patient has significant sleep apnea and his AHI was 24/hour.         I told the patient that he has obstructive sleep apnea based on the sleep study.  The symptoms also suggest the likelihood of obstructive sleep apnea.    Based on the above, we will start the patient on AutoPap at empiric pressure of 8/16 cm H2O.     I have asked him to call us back, if there are any issues with mask or the device.    Based on symptomatic  response, patient may need either a full night titration study or empiric change in his CPAP pressures.     Patient was educated on good sleep hygiene measures and voiced understanding of the same.     Patient was counseled regarding compliance with the machine and appropriate care of mask and device was discussed.    I had a detailed discussion with the patient regarding his symptoms that are very suggestive of asthma    Orders as above.    The patient was started on Breo with instructions to rinse his mouth with water after use.     The patient may be started on Singulair    Other contributing factors may also need to be treated and this will be discussed with the patient, if and when applicable    Patient was advised to use rescue inhaler for when necessary purposes    Patient was also advised to keep a log of the use of rescue inhaler.    Patient was given reading material.    Patient was advised to continue his nasal spray, especially given improvement in symptoms overall.      Dictated utilizing Dragon dictation.    This document was electronically signed by Ghassan Agustin MD on 12/07/21 at 15:13 EST

## 2021-12-10 ENCOUNTER — HOSPITAL ENCOUNTER (EMERGENCY)
Facility: HOSPITAL | Age: 56
Discharge: HOME | End: 2021-12-10
Attending: FAMILY MEDICINE
Payer: MEDICARE

## 2021-12-10 VITALS
WEIGHT: 258 LBS | TEMPERATURE: 99.6 F | SYSTOLIC BLOOD PRESSURE: 134 MMHG | RESPIRATION RATE: 18 BRPM | DIASTOLIC BLOOD PRESSURE: 90 MMHG | BODY MASS INDEX: 34.95 KG/M2 | HEIGHT: 72 IN | OXYGEN SATURATION: 95 % | HEART RATE: 81 BPM

## 2021-12-10 DIAGNOSIS — M10.9 GOUTY ARTHRITIS OF LEFT GREAT TOE: Primary | ICD-10-CM

## 2021-12-10 PROCEDURE — 99283 EMERGENCY DEPT VISIT LOW MDM: CPT

## 2021-12-10 PROCEDURE — 6370000000 HC RX 637 (ALT 250 FOR IP): Performed by: FAMILY MEDICINE

## 2021-12-10 RX ORDER — HYDROCODONE BITARTRATE AND ACETAMINOPHEN 7.5; 325 MG/1; MG/1
1 TABLET ORAL ONCE
Status: COMPLETED | OUTPATIENT
Start: 2021-12-10 | End: 2021-12-10

## 2021-12-10 RX ORDER — COLCHICINE 0.6 MG/1
TABLET ORAL
Qty: 15 TABLET | Refills: 0 | Status: SHIPPED | OUTPATIENT
Start: 2021-12-10

## 2021-12-10 RX ORDER — COLCHICINE 0.6 MG/1
0.6 CAPSULE ORAL ONCE
Status: COMPLETED | OUTPATIENT
Start: 2021-12-10 | End: 2021-12-10

## 2021-12-10 RX ORDER — PREDNISONE 20 MG/1
TABLET ORAL
Qty: 13 TABLET | Refills: 0 | Status: SHIPPED | OUTPATIENT
Start: 2021-12-10

## 2021-12-10 RX ORDER — INDOMETHACIN 50 MG/1
50 CAPSULE ORAL EVERY 8 HOURS PRN
Qty: 15 CAPSULE | Refills: 0 | Status: SHIPPED | OUTPATIENT
Start: 2021-12-10

## 2021-12-10 RX ORDER — PREDNISONE 50 MG/1
50 TABLET ORAL ONCE
Status: COMPLETED | OUTPATIENT
Start: 2021-12-10 | End: 2021-12-10

## 2021-12-10 RX ORDER — INDOMETHACIN 25 MG/1
50 CAPSULE ORAL ONCE
Status: COMPLETED | OUTPATIENT
Start: 2021-12-10 | End: 2021-12-10

## 2021-12-10 RX ADMIN — HYDROCODONE BITARTRATE AND ACETAMINOPHEN 1 TABLET: 7.5; 325 TABLET ORAL at 21:53

## 2021-12-10 RX ADMIN — INDOMETHACIN 50 MG: 25 CAPSULE ORAL at 21:46

## 2021-12-10 RX ADMIN — PREDNISONE 50 MG: 50 TABLET ORAL at 21:47

## 2021-12-10 RX ADMIN — COLCHICINE 0.6 MG: 0.6 CAPSULE ORAL at 21:46

## 2021-12-10 ASSESSMENT — PAIN SCALES - GENERAL
PAINLEVEL_OUTOF10: 10

## 2021-12-10 ASSESSMENT — PAIN DESCRIPTION - ORIENTATION: ORIENTATION: LEFT

## 2021-12-10 ASSESSMENT — PAIN DESCRIPTION - LOCATION: LOCATION: FOOT

## 2021-12-10 ASSESSMENT — PAIN - FUNCTIONAL ASSESSMENT: PAIN_FUNCTIONAL_ASSESSMENT: 0-10

## 2021-12-10 ASSESSMENT — ENCOUNTER SYMPTOMS: COLOR CHANGE: 1

## 2021-12-11 NOTE — ED NOTES
Pt c/o acute onset of pain and swelling in R great toe. NKI. Ambulates w steady gait.      Glneny Fontaine RN  12/10/21 3348

## 2021-12-11 NOTE — ED PROVIDER NOTES
[address] [place] [address] Court  eMERGENCY dEPARTMENT eNCOUnter      Pt Name: Johanna Arora  MRN: [id number]  [place] 1965  Date of evaluation: 12/10/2021  Provider: Greg Acosta, [address] [place] [address]       Chief Complaint   Patient presents with    Foot Pain         HISTORY OF PRESENT ILLNESS   (Location/Symptom, Timing/Onset, Context/Setting, Quality, Duration, Modifying Factors, Severity)  Note limiting factors. Johanna Arora is a 49 y.o. male who presents to the emergency department for having pain, redness, and swelling to his left great toe. Pt without history of gout. Pt states that he started having pain and swelling to his left great toe. Pt with some pain with walking on foot. No wound or injury to foot. No numbness or tingling to foot. Pt takes [place] at home but doesn't seem to help with the pain. Nursing Notes were reviewed. REVIEW OF SYSTEMS    (2-9 systems for level 4, 10 or more forlevel 5)     Review of Systems   Musculoskeletal: Positive for arthralgias, gait problem and joint swelling. Skin: Positive for color change. All other systems reviewed and are negative. PAST MEDICAL HISTORY     Past Medical History:   Diagnosis Date    Hypertension          SURGICAL HISTORY       Past Surgical History:   Procedure Laterality Date    CARDIAC CATHETERIZATION           CURRENT MEDICATIONS       Previous Medications    BISOPROLOL (ZEBETA) 5 MG TABLET    Take 5 mg by mouth daily    FLUTICASONE (FLONASE) 50 MCG/ACT NASAL SPRAY    2 sprays by Each Nostril route daily    HYDROCODONE-ACETAMINOPHEN (NORCO) 7.5-325 MG PER TABLET    Take 1 tablet by mouth every 12 hours as needed for Pain. SILDENAFIL (VIAGRA) 50 MG TABLET    Take 50 mg by mouth as needed for Erectile Dysfunction       ALLERGIES     Patient has no known allergies. FAMILY HISTORY     History reviewed. No pertinent family history.        SOCIAL HISTORY       Social History     Socioeconomic History    Marital status:      Spouse name: None    Number of children: None    Years of education: None    Highest education level: None   Occupational History    None   Tobacco Use    Smoking status: Never Smoker    Smokeless tobacco: Never Used   Substance and Sexual Activity    Alcohol use: Not Currently    Drug use: None    Sexual activity: None   Other Topics Concern    None   Social History Narrative    None     Social Determinants of Health     Financial Resource Strain:     Difficulty of Paying Living Expenses: Not on file   Food Insecurity:     Worried About Running Out of Food in the Last Year: Not on file    Eladio of Food in the Last Year: Not on file   Transportation Needs:     Lack of Transportation (Medical): Not on file    Lack of Transportation (Non-Medical):  Not on file   Physical Activity:     Days of Exercise per Week: Not on file    Minutes of Exercise per Session: Not on file   Stress:     Feeling of Stress : Not on file   Social Connections:     Frequency of Communication with Friends and Family: Not on file    Frequency of Social Gatherings with Friends and Family: Not on file    Attends Confucianism Services: Not on file    Active Member of [address] [place] [address] or Organizations: Not on file    Attends Club or Organization Meetings: Not on file    Marital Status: Not on file   Intimate Partner Violence:     Fear of Current or Ex-Partner: Not on file    Emotionally Abused: Not on file    Physically Abused: Not on file    Sexually Abused: Not on file   Housing Stability:     Unable to Pay for Housing in the Last Year: Not on file    Number of [place] in the Last Year: Not on file    Unstable Housing in the Last Year: Not on file       SCREENINGS             PHYSICAL EXAM    (up to 7 for level 4, 8 or more for level 5)     ED Triage Vitals [12/10/21 1925]   BP Temp Temp Source Pulse Resp SpO2 Height Weight   (!) 134/90 99.6 °F (37.6 °C) Oral 81 18 95 % 6' (1.829 m) 258 lb (117 kg)       Physical Exam  Vitals and nursing note reviewed. Constitutional:       General: He is not in acute distress. Appearance: Normal appearance. HENT:      Head: Normocephalic. Eyes:      Extraocular Movements: Extraocular movements intact. Conjunctiva/sclera: Conjunctivae normal.      Pupils: Pupils are equal, round, and reactive to light. Cardiovascular:      Rate and Rhythm: Normal rate and regular rhythm. Heart sounds: Normal heart sounds. Pulmonary:      Effort: Pulmonary effort is normal.      Breath sounds: Normal breath sounds. Musculoskeletal:         General: Swelling and tenderness present. No signs of injury. Cervical back: Neck supple. Comments: Pt with podagra of left great toe. Mild edema to left great toe. Moderate erythema to the MTP and PIP of left great toe. No wound noted. Tenderness to palpation. No lymphangitis. Limited ROM due to pain. Neurovascularly intact left foot. Skin:     General: Skin is warm and dry. Neurological:      Mental Status: He is alert and oriented to person, place, and time. Psychiatric:         Mood and Affect: Mood normal.         Behavior: Behavior normal.         DIAGNOSTIC RESULTS     EKG: All EKG's are interpreted by the Emergency Department Physician who either signs or Co-signs this chart in the absence of a cardiologist.    None    RADIOLOGY:   Non-plain film images such as CT, Ultrasound and MRI are read by the radiologist. Plain radiographic images are visualized andpreliminarily interpreted by the emergency physician with the below findings:    None    Interpretationper the Radiologist below, if available at the time of this note:    No orders to display         ED BEDSIDE ULTRASOUND:   Performed by ED Physician - none    LABS:  Labs Reviewed - No data to display    All other labs were within normal range or not returned as of this dictation.     EMERGENCY DEPARTMENT COURSE and DIFFERENTIAL DIAGNOSIS/MDM:   Vitals: Vitals:    12/10/21 1925   BP: (!) 134/90   Pulse: 81   Resp: 18   Temp: 99.6 °F (37.6 °C)   TempSrc: Oral   SpO2: 95%   Weight: 258 lb (117 kg)   Height: 6' (1.829 m)           CRITICAL CARE TIME   Total Critical Care time was 0 minutes, excluding separatelyreportable procedures. There was a high probability ofclinically significant/life threatening deterioration in the patient's condition which required my urgent intervention. CONSULTS:  None    PROCEDURES:  None    PROGRESS NOTES:    Pt without wound to left great toe. No history of gout but classic in appearance. Doesn't have family history. Doesn't drink. Will give Indocin, Prednisone. Will give Colchicine. Pt to continue to take his pain meds. FINAL IMPRESSION      1.  Gouty arthritis of left great toe New Problem         DISPOSITION/PLAN   DISPOSITION Decision To Discharge 12/10/2021 09:38:38 PM      PATIENT REFERRED TO:    Follow up with PCP early Monday if symptoms continue or worsen          DISCHARGE MEDICATIONS:  New Prescriptions    COLCHICINE (COLCRYS) 0.6 MG TABLET    Take 1 tab with onset of pain, then take one tab every 4 hrs as needed    INDOMETHACIN (INDOCIN) 50 MG CAPSULE    Take 1 capsule by mouth every 8 hours as needed for Pain    PREDNISONE (DELTASONE) 20 MG TABLET    Take 3 tabs on day 1-2, take 2 tabs on day 3-4, take 1 tab on day 5-7       (Please note that portions of this note were completed with a voice recognition program.  Efforts were made to edit the dictations but occasionallywords are mis-transcribed.)    Roberto Yancey DO (electronically signed)  Attending Emergency Physician          Roberto Yancey DO  12/10/21 5067

## 2022-01-01 ENCOUNTER — APPOINTMENT (OUTPATIENT)
Dept: GENERAL RADIOLOGY | Facility: HOSPITAL | Age: 57
End: 2022-01-01
Payer: MEDICARE

## 2022-01-01 ENCOUNTER — HOSPITAL ENCOUNTER (EMERGENCY)
Facility: HOSPITAL | Age: 57
Discharge: HOME | End: 2022-01-01
Attending: EMERGENCY MEDICINE
Payer: MEDICARE

## 2022-01-01 VITALS
TEMPERATURE: 100.1 F | DIASTOLIC BLOOD PRESSURE: 84 MMHG | BODY MASS INDEX: 34.95 KG/M2 | HEART RATE: 95 BPM | WEIGHT: 258 LBS | RESPIRATION RATE: 20 BRPM | OXYGEN SATURATION: 98 % | SYSTOLIC BLOOD PRESSURE: 125 MMHG | HEIGHT: 72 IN

## 2022-01-01 DIAGNOSIS — J40 BRONCHITIS: Primary | ICD-10-CM

## 2022-01-01 LAB
RAPID INFLUENZA  B AGN: NEGATIVE
RAPID INFLUENZA A AGN: NEGATIVE
SARS-COV-2, NAAT: NOT DETECTED

## 2022-01-01 PROCEDURE — 71045 X-RAY EXAM CHEST 1 VIEW: CPT

## 2022-01-01 PROCEDURE — 6370000000 HC RX 637 (ALT 250 FOR IP): Performed by: EMERGENCY MEDICINE

## 2022-01-01 PROCEDURE — 87635 SARS-COV-2 COVID-19 AMP PRB: CPT

## 2022-01-01 PROCEDURE — 87804 INFLUENZA ASSAY W/OPTIC: CPT

## 2022-01-01 PROCEDURE — 99283 EMERGENCY DEPT VISIT LOW MDM: CPT

## 2022-01-01 RX ORDER — AZITHROMYCIN 250 MG/1
500 TABLET, FILM COATED ORAL ONCE
Status: COMPLETED | OUTPATIENT
Start: 2022-01-01 | End: 2022-01-01

## 2022-01-01 RX ORDER — AZITHROMYCIN 250 MG/1
250 TABLET, FILM COATED ORAL SEE ADMIN INSTRUCTIONS
Qty: 6 TABLET | Refills: 0 | Status: SHIPPED | OUTPATIENT
Start: 2022-01-01 | End: 2022-01-06

## 2022-01-01 RX ORDER — IBUPROFEN 600 MG/1
600 TABLET ORAL ONCE
Status: COMPLETED | OUTPATIENT
Start: 2022-01-01 | End: 2022-01-01

## 2022-01-01 RX ADMIN — AZITHROMYCIN MONOHYDRATE 500 MG: 250 TABLET ORAL at 17:19

## 2022-01-01 RX ADMIN — IBUPROFEN 600 MG: 600 TABLET ORAL at 15:52

## 2022-01-01 ASSESSMENT — PAIN SCALES - GENERAL
PAINLEVEL_OUTOF10: 3
PAINLEVEL_OUTOF10: 3

## 2022-01-01 ASSESSMENT — PAIN DESCRIPTION - DESCRIPTORS: DESCRIPTORS: ACHING

## 2022-01-01 NOTE — ED NOTES
Dc instructions given to patient at this time, patient to  his rx from his selected pharmacy tomorrow. Patient encouraged to return for further problems or concerns.      Brandi San RN  01/01/22 6275

## 2022-01-01 NOTE — ED PROVIDER NOTES
[address] [facility] ENCOUNTER      Pt Name: Maria Eugenia Andrews  MRN: [id number]  YOB: 1965  Date of evaluation: 1/1/2022  Provider: Dee Gong DO    CHIEF COMPLAINT       Chief Complaint   Patient presents with    Cough     fever, cough, generalized feeling bad. HISTORY OF PRESENT ILLNESS  (Location/Symptom, Timing/Onset, Context/Setting, Quality, Duration, Modifying Factors, Severity.)   Maria Eugenia Andrews is a 49 y.o. male who presents to the emergency department with a chief complaint of cough fever body aches for 2 days. Patient not vaccinated for Covid. Nursing notes were reviewed. REVIEW OFSYSTEMS    (2-9 systems for level 4, 10 or more for level 5)   ROS:  General:  +fevers, no chills, no weakness  Cardiovascular:  No chest pain, no palpitations  Respiratory:  No shortness of breath, +cough, no wheezing  Gastrointestinal:  No pain, no nausea, no vomiting, no diarrhea  Musculoskeletal:  No muscle pain, no joint pain  Skin:  No rash, no easy bruising  Neurologic:  No speech problems, no headache, no extremity weakness  Psychiatric:  No anxiety  Genitourinary:  No dysuria, no hematuria    Except as noted above the remainder of the review of systems was reviewed and negative.        PAST MEDICAL HISTORY     Past Medical History:   Diagnosis Date    Hypertension          SURGICAL HISTORY       Past Surgical History:   Procedure Laterality Date    BACK SURGERY      CARDIAC CATHETERIZATION      CHOLECYSTECTOMY           CURRENT MEDICATIONS       Previous Medications    BISOPROLOL (ZEBETA) 5 MG TABLET    Take 5 mg by mouth daily    COLCHICINE (COLCRYS) 0.6 MG TABLET    Take 1 tab with onset of pain, then take one tab every 4 hrs as needed    FLUTICASONE (FLONASE) 50 MCG/ACT NASAL SPRAY    2 sprays by Each Nostril route daily    HYDROCODONE-ACETAMINOPHEN (NORCO) 7.5-325 MG PER TABLET    Take 1 tablet by mouth every 12 hours as needed for Pain.    INDOMETHACIN (INDOCIN) 50 MG CAPSULE    Take 1 capsule by mouth every 8 hours as needed for Pain    PREDNISONE (DELTASONE) 20 MG TABLET    Take 3 tabs on day 1-2, take 2 tabs on day 3-4, take 1 tab on day 5-7    SILDENAFIL (VIAGRA) 50 MG TABLET    Take 50 mg by mouth as needed for Erectile Dysfunction       ALLERGIES     Patient has no known allergies. FAMILY HISTORY     History reviewed. No pertinent family history. SOCIAL HISTORY       Social History     Socioeconomic History    Marital status:      Spouse name: None    Number of children: None    Years of education: None    Highest education level: None   Occupational History    None   Tobacco Use    Smoking status: Never Smoker    Smokeless tobacco: Never Used   Substance and Sexual Activity    Alcohol use: Not Currently    Drug use: None    Sexual activity: None   Other Topics Concern    None   Social History Narrative    None     Social Determinants of Health     Financial Resource Strain:     Difficulty of Paying Living Expenses: Not on file   Food Insecurity:     Worried About Running Out of Food in the Last Year: Not on file    Eladio of Food in the Last Year: Not on file   Transportation Needs:     Lack of Transportation (Medical): Not on file    Lack of Transportation (Non-Medical):  Not on file   Physical Activity:     Days of Exercise per Week: Not on file    Minutes of Exercise per Session: Not on file   Stress:     Feeling of Stress : Not on file   Social Connections:     Frequency of Communication with Friends and Family: Not on file    Frequency of Social Gatherings with Friends and Family: Not on file    Attends Scientologist Services: Not on file    Active Member of Clubs or Organizations: Not on file    Attends Club or Organization Meetings: Not on file    Marital Status: Not on file   Intimate Partner Violence:     Fear of Current or Ex-Partner: Not on file    Emotionally Abused: Not on file   Rosetta Medina Physically Abused: Not on file    Sexually Abused: Not on file   Housing Stability:     Unable to Pay for Housing in the Last Year: Not on file    Number of Places Lived in the Last Year: Not on file    Unstable Housing in the Last Year: Not on file         PHYSICAL EXAM    (up to 7 for level 4, 8 or more for level 5)     ED Triage Vitals [01/01/22 1450]   BP Temp Temp Source Pulse Resp SpO2 Height Weight   125/84 100.1 °F (37.8 °C) Oral 95 20 98 % 6' (1.829 m) 258 lb (117 kg)       Physical Exam  General :Patient is awake, alert, oriented, in no acute distress, nontoxic appearing  HEENT: Pupils are equally round and reactive to light, EOMI, conjunctivae clear. Oral mucosa is moist, no exudate. Uvula is midline  Neck: Neck is supple, full range of motion, trachea midline  Cardiac: Heart regular rate, rhythm, no murmurs, rubs, or gallops  Lungs: Lungs are clear to auscultation, there is no wheezing, rhonchi, or rales. There is no use of accessory muscles. Abdomen: Abdomen is soft, nontender, nondistended. There is no firm or pulsatile masses, no rebound rigidity or guarding. Musculoskeletal: 5 out of 5 strength in all 4 extremities. No focal muscle deficits are appreciated  Neuro: Motor intact, sensory intact, level of consciousness is normal, normal gait. Dermatology: Skin is warm and dry  Psych: Mentation is grossly normal, cognition is grossly normal. Affect is appropriate. DIAGNOSTIC RESULTS     EKG: All EKG's are interpreted by the Emergency Department Physician who either signs or Co-signs this chart in the [address] a cardiologist.      RADIOLOGY:   Non-plain film images such as CT, Ultrasound and MRI are read by the radiologist. Plain radiographic images are visualized and preliminarily interpreted by the emergency physician with the below findings:      ? Radiologist's Report Reviewed:  XR CHEST PORTABLE   Final Result   1. No acute disease.             ED BEDSIDE ULTRASOUND:   Performed by ED Physician - none    LABS:    I have reviewed and interpreted all of the currently available lab results from this visit (ifapplicable):  Results for orders placed or performed during the hospital encounter of 01/01/22   Rapid Influenza A/B Antigens    Specimen: Nasopharyngeal   Result Value Ref Range    Rapid Influenza A Ag Negative Negative    Rapid Influenza B Ag Negative Negative   COVID-19, Rapid    Specimen: Nasopharyngeal Swab   Result Value Ref Range    SARS-CoV-2, NAAT Not Detected Not Detected        All other labs were within normal range or not returned as of this dictation. EMERGENCY DEPARTMENT COURSE and DIFFERENTIAL DIAGNOSIS/MDM:   Vitals:    Vitals:    01/01/22 1450   BP: 125/84   Pulse: 95   Resp: 20   Temp: 100.1 °F (37.8 °C)   TempSrc: Oral   SpO2: 98%   Weight: 258 lb (117 kg)   Height: 6' (1.829 m)       MEDICATIONS ADMINISTERED IN ED:  Medications   azithromycin (ZITHROMAX) tablet 500 mg (has no administration in time range)   ibuprofen (ADVIL;MOTRIN) tablet 600 mg (600 mg Oral Given 1/1/22 1552)       Patient's wife actually has a pneumonia. Therefore the odds of this being bacterial instead of just viral in nature are much higher we will cover him bronchitis with Zithromax. The patient will follow-up with their PCP in 1-2 days for reevaluation. If the patient or family members have anyfurther concerns or any worsening symptoms they will return to the ED for reevaluation. CONSULTS:  None    PROCEDURES:  Procedures    CRITICAL CARE TIME    Total Critical Care time was 0 minutes, excluding separately reportable procedures. There was a high probability of clinically significant/life threatening deterioration in the patient's condition which required my urgent intervention. FINAL IMPRESSION      1.  Bronchitis          DISPOSITION/PLAN   DISPOSITION        PATIENT REFERRED TO:  Lencho Hughes [address] [facility] [address] [phone]    In 2 tyson      [facility] Emergency Department  [address] [facility]  [phone]    As needed, If symptoms worsen      DISCHARGE MEDICATIONS:  New Prescriptions    AZITHROMYCIN (ZITHROMAX) 250 MG TABLET    Take 1 tablet by mouth See Admin Instructions for 5 days 500mg on day 1 followed by 250mg on days 2 - 5       Comment: Please note this report has been produced using speech recognition software and may contain errorsrelated to that system including errors in grammar, punctuation, and spelling, as well as words and phrases that may be inappropriate. If there are any questions or concerns please feel free to contact the dictating providerfor clarification.     Tay Alonso,   Attending Emergency Physician              Tay Alonso, DO  01/01/22 5830 [facility], DO  01/01/22 2165

## 2022-01-05 ENCOUNTER — TELEPHONE (OUTPATIENT)
Dept: SURGERY | Facility: CLINIC | Age: 57
End: 2022-01-05

## 2022-03-24 ENCOUNTER — OFFICE VISIT (OUTPATIENT)
Dept: PULMONOLOGY | Facility: CLINIC | Age: 57
End: 2022-03-24

## 2022-03-24 VITALS
HEIGHT: 72 IN | DIASTOLIC BLOOD PRESSURE: 80 MMHG | OXYGEN SATURATION: 99 % | BODY MASS INDEX: 35.35 KG/M2 | HEART RATE: 65 BPM | SYSTOLIC BLOOD PRESSURE: 122 MMHG | WEIGHT: 261 LBS | RESPIRATION RATE: 18 BRPM

## 2022-03-24 DIAGNOSIS — R06.02 SHORTNESS OF BREATH: ICD-10-CM

## 2022-03-24 DIAGNOSIS — G47.33 OBSTRUCTIVE SLEEP APNEA: Primary | ICD-10-CM

## 2022-03-24 DIAGNOSIS — J45.40 MODERATE PERSISTENT ASTHMA WITHOUT COMPLICATION: ICD-10-CM

## 2022-03-24 DIAGNOSIS — G47.19 EXCESSIVE DAYTIME SLEEPINESS: ICD-10-CM

## 2022-03-24 DIAGNOSIS — E66.01 MORBIDLY OBESE: ICD-10-CM

## 2022-03-24 PROCEDURE — 99214 OFFICE O/P EST MOD 30 MIN: CPT | Performed by: NURSE PRACTITIONER

## 2022-03-24 PROCEDURE — 94729 DIFFUSING CAPACITY: CPT | Performed by: INTERNAL MEDICINE

## 2022-03-24 PROCEDURE — 94726 PLETHYSMOGRAPHY LUNG VOLUMES: CPT | Performed by: INTERNAL MEDICINE

## 2022-03-24 PROCEDURE — 94060 EVALUATION OF WHEEZING: CPT | Performed by: INTERNAL MEDICINE

## 2022-03-24 RX ORDER — AZITHROMYCIN 250 MG/1
TABLET, FILM COATED ORAL
COMMUNITY
Start: 2022-01-02 | End: 2022-10-27

## 2022-03-24 RX ORDER — INDOMETHACIN 50 MG/1
50 CAPSULE ORAL AS NEEDED
COMMUNITY
Start: 2021-12-10 | End: 2022-09-15

## 2022-03-24 RX ORDER — INDOMETHACIN 50 MG/1
CAPSULE ORAL
COMMUNITY
Start: 2022-03-01 | End: 2022-09-15

## 2022-03-24 RX ORDER — FLUTICASONE PROPIONATE 50 MCG
2 SPRAY, SUSPENSION (ML) NASAL DAILY
Qty: 16 G | Refills: 5 | Status: SHIPPED | OUTPATIENT
Start: 2022-03-24

## 2022-03-24 RX ORDER — ALBUTEROL SULFATE 90 UG/1
2 AEROSOL, METERED RESPIRATORY (INHALATION) EVERY 4 HOURS PRN
Qty: 18 G | Refills: 5 | Status: SHIPPED | OUTPATIENT
Start: 2022-03-24

## 2022-03-24 RX ORDER — MONTELUKAST SODIUM 10 MG/1
10 TABLET ORAL EVERY EVENING
COMMUNITY
Start: 2021-12-29 | End: 2022-09-15

## 2022-03-24 RX ORDER — PREDNISONE 20 MG/1
TABLET ORAL
COMMUNITY
Start: 2021-12-10 | End: 2022-09-15

## 2022-03-24 RX ORDER — PANTOPRAZOLE SODIUM 40 MG/1
40 TABLET, DELAYED RELEASE ORAL DAILY
COMMUNITY
Start: 2021-12-29 | End: 2022-10-27

## 2022-03-24 RX ORDER — COLCHICINE 0.6 MG/1
TABLET ORAL
COMMUNITY
Start: 2022-03-01 | End: 2023-01-16 | Stop reason: HOSPADM

## 2022-03-24 NOTE — PROGRESS NOTES
"Chief Complaint   Patient presents with   • Follow-up   • Shortness of Breath         Subjective   Charly Braun is a 56 y.o. male.     History of Present Illness   Patient comes today for follow up of shortness of breath, asthma, and sleep apnea.    Symptoms have been stable since the last clinic visit. Patient reports no recent exacerbations.     He describes coughing spells. If he laughs he has a coughing fit and feels like he may pass out. He reports having a lot of mucus.     Patient is using medications, as prescribed. He is using Breo daily. He states the albuterol does help when he has a coughing spell. He is using it 1-2 times a day.     Exercise tolerance has also remained stable.     He uses Flonase daily. This helps significantly with nasal congestion.    He was prescribed singulair but was afraid to take it due to risk of increased depression.     I reviewed his sleep study and discussed the results with him today.  The sleep study revealed moderate sleep apnea with an apnea hypopnea index of 24 per hour.      He has been set up with AutoPAP at a pressure of 8/16.   He states he has had the machine about 1 month. He is trying to get used to the mask. He is using a full facemask now.       The following portions of the patient's history were reviewed and updated as appropriate: allergies, current medications, past family history, past medical history, past social history and past surgical history.      Review of Systems   HENT: Positive for sneezing.    Respiratory: Positive for cough.    Psychiatric/Behavioral: Positive for sleep disturbance.       Objective   Visit Vitals  /80   Pulse 65   Resp 18   Ht 182.9 cm (72.01\")   Wt 118 kg (261 lb)   SpO2 99%   BMI 35.39 kg/m²         Physical Exam  Vitals reviewed.   HENT:      Head: Atraumatic.      Mouth/Throat:      Mouth: Mucous membranes are moist.   Eyes:      Extraocular Movements: Extraocular movements intact.   Cardiovascular:      Rate and " Rhythm: Normal rate and regular rhythm.   Pulmonary:      Effort: Pulmonary effort is normal. No respiratory distress.      Breath sounds: Normal breath sounds. No wheezing, rhonchi or rales.   Abdominal:      Comments: Obese abdomen.   Musculoskeletal:      Cervical back: Neck supple.   Neurological:      Mental Status: He is alert and oriented to person, place, and time.           Assessment/Plan   Diagnoses and all orders for this visit:    1. Obstructive sleep apnea (Primary)  -     BIPAP / CPAP Adjustment    2. Moderate persistent asthma without complication    3. Excessive daytime sleepiness    4. Morbidly obese (HCC)  Assessment & Plan:  Patient's (Body mass index is 35.39 kg/m².) indicates that they are obese (BMI >30) with health conditions that include obstructive sleep apnea . Weight is unchanged. BMI is is above average; BMI management plan is completed. We discussed portion control and increasing exercise.       Other orders  -     albuterol sulfate HFA (Ventolin HFA) 108 (90 Base) MCG/ACT inhaler; Inhale 2 puffs Every 4 (Four) Hours As Needed for Wheezing or Shortness of Air.  Dispense: 18 g; Refill: 5  -     fluticasone (FLONASE) 50 MCG/ACT nasal spray; 2 sprays into the nostril(s) as directed by provider Daily.  Dispense: 16 g; Refill: 5  -     Fluticasone Furoate-Vilanterol (Breo Ellipta) 200-25 MCG/INH inhaler; Inhale 1 puff Daily. Rinse mouth with water after use.  Dispense: 1 each; Refill: 5         No follow-ups on file.    DISCUSSION (if any):  FeNO at last visit 24 ppb.    His symptoms of asthma are under poor control at this time. I have increased the Breo to 200/25. He should continue the albuterol as needed.     He should continue Flonase.     I have asked him to try the singulair nightly. If depression symptoms are worse then stop the medication.    Patient's medications for underlying asthma were reviewed with him in great detail.    Any needed adjustments to his pulmonary medications,  either for clinical or insurance coverage reasons, have been made and are reflected in the orders.    Side effects of prescribed medications discussed with the patient.    Asthma action plan with discussed with him.    The patient was asked to call this office if the symptoms worsen.    Continue treatment with AutoPAP at a pressure of 8/16, with a full-face mask. I have ordered a nasal mask for patient to try.     Unfortunately, I do not have a compliance report to discuss with the patient during his visit.    Humidification setup, hose and mask care discussed.    Weight loss advised.    Use every night for at least 4 hours stressed.      Dictated utilizing Dragon dictation.    This document was electronically signed by MARGOT Ro March 24, 2022  09:35 EDT

## 2022-03-24 NOTE — ASSESSMENT & PLAN NOTE
Patient's (Body mass index is 35.39 kg/m².) indicates that they are obese (BMI >30) with health conditions that include obstructive sleep apnea . Weight is unchanged. BMI is is above average; BMI management plan is completed. We discussed portion control and increasing exercise.

## 2022-04-02 DIAGNOSIS — E66.01 MORBIDLY OBESE: ICD-10-CM

## 2022-04-04 RX ORDER — BISOPROLOL FUMARATE 5 MG/1
TABLET, FILM COATED ORAL
Qty: 135 TABLET | Refills: 0 | Status: SHIPPED | OUTPATIENT
Start: 2022-04-04 | End: 2022-07-11

## 2022-07-10 DIAGNOSIS — E66.01 MORBIDLY OBESE: ICD-10-CM

## 2022-07-11 RX ORDER — BISOPROLOL FUMARATE 5 MG/1
TABLET, FILM COATED ORAL
Qty: 135 TABLET | Refills: 0 | Status: SHIPPED | OUTPATIENT
Start: 2022-07-11 | End: 2022-10-10

## 2022-07-28 ENCOUNTER — HOSPITAL ENCOUNTER (OUTPATIENT)
Dept: ULTRASOUND IMAGING | Facility: HOSPITAL | Age: 57
Discharge: HOME | End: 2022-07-28
Payer: MEDICARE

## 2022-07-28 DIAGNOSIS — R94.5 ABNORMAL RESULTS OF LIVER FUNCTION STUDIES: ICD-10-CM

## 2022-07-28 PROCEDURE — 76705 ECHO EXAM OF ABDOMEN: CPT

## 2022-08-10 ENCOUNTER — OFFICE VISIT (OUTPATIENT)
Dept: PULMONOLOGY | Facility: CLINIC | Age: 57
End: 2022-08-10

## 2022-08-10 VITALS
SYSTOLIC BLOOD PRESSURE: 110 MMHG | HEART RATE: 65 BPM | DIASTOLIC BLOOD PRESSURE: 73 MMHG | WEIGHT: 252 LBS | OXYGEN SATURATION: 95 % | HEIGHT: 72 IN | RESPIRATION RATE: 16 BRPM | BODY MASS INDEX: 34.13 KG/M2

## 2022-08-10 DIAGNOSIS — E66.9 OBESITY (BMI 30-39.9): ICD-10-CM

## 2022-08-10 DIAGNOSIS — G47.33 OBSTRUCTIVE SLEEP APNEA: Primary | ICD-10-CM

## 2022-08-10 DIAGNOSIS — J30.89 OTHER ALLERGIC RHINITIS: ICD-10-CM

## 2022-08-10 DIAGNOSIS — J45.40 MODERATE PERSISTENT ASTHMA WITHOUT COMPLICATION: ICD-10-CM

## 2022-08-10 PROCEDURE — 99214 OFFICE O/P EST MOD 30 MIN: CPT | Performed by: INTERNAL MEDICINE

## 2022-08-10 RX ORDER — CYCLOBENZAPRINE HCL 10 MG
10 TABLET ORAL 2 TIMES DAILY PRN
COMMUNITY
Start: 2022-08-09

## 2022-08-10 RX ORDER — ATORVASTATIN CALCIUM 20 MG/1
20 TABLET, FILM COATED ORAL DAILY
COMMUNITY
Start: 2022-07-05

## 2022-08-10 RX ORDER — LANOLIN ALCOHOL/MO/W.PET/CERES
CREAM (GRAM) TOPICAL
COMMUNITY
Start: 2022-07-12

## 2022-08-10 RX ORDER — LOSARTAN POTASSIUM 25 MG/1
25 TABLET ORAL DAILY
COMMUNITY
Start: 2022-07-12

## 2022-08-10 RX ORDER — ALLOPURINOL 100 MG/1
100 TABLET ORAL DAILY
COMMUNITY
Start: 2022-07-12

## 2022-08-10 NOTE — PROGRESS NOTES
"  Chief Complaint   Patient presents with   • Shortness of Breath   • Follow-up       Subjective   Charly Braun is a 57 y.o. male.     History of Present Illness   Patient was evaluated today for follow up of asthma, and BRYNN. Patient does not report any recent exacerbations requiring emergency room visits or hospitalizations.    Patient is compliant with pulmonary medicines, as prescribed.     he is currently on Breo. he is using the rescue inhalers minimally.     Patient reports significant worsening in daytime sleepiness since he has not been on CPAP.    He had issues with his mask and by the time he finally got the nasal pillows, he had not been compliant and the insurance asked his CPAP to be discontinued.     He is once again have daytime sleepiness, tiredness and snoring.     Patient says that he has been using his nasal sprays on a regular basis and describes no significant ongoing issues other than occasional congestion.     The following portions of the patient's history were reviewed and updated as appropriate: allergies, current medications, past family history, past medical history, past social history and past surgical history.    Review of Systems   HENT: Positive for sinus pressure.    Respiratory: Positive for shortness of breath.    Cardiovascular: Positive for palpitations.   Psychiatric/Behavioral: Positive for sleep disturbance.       Objective   Visit Vitals  /73   Pulse 65   Resp 16   Ht 182.9 cm (72\")   Wt 114 kg (252 lb)   SpO2 95%   BMI 34.18 kg/m²       Physical Exam  Vitals reviewed.   Constitutional:       Appearance: He is well-developed.   HENT:      Head: Atraumatic.      Mouth/Throat:      Comments: Oropharynx was crowded.  Neck:      Comments: Increased neck circumference noted.  Pulmonary:      Effort: Pulmonary effort is normal. No respiratory distress.      Breath sounds: Normal breath sounds. No wheezing or rales.   Musculoskeletal:      Comments: Gait was normal. "   Neurological:      Mental Status: He is alert and oriented to person, place, and time.         Assessment & Plan   Diagnoses and all orders for this visit:    1. Obstructive sleep apnea (Primary)  -     Polysomnography 4 or More Parameters With CPAP; Future  -     COVID PRE-OP / PRE-PROCEDURE SCREENING ORDER (NO ISOLATION) - Swab, Nasopharynx; Future    2. Moderate persistent asthma without complication    3. Obesity (BMI 30-39.9)  -     Polysomnography 4 or More Parameters With CPAP; Future  -     COVID PRE-OP / PRE-PROCEDURE SCREENING ORDER (NO ISOLATION) - Swab, Nasopharynx; Future    4. Other allergic rhinitis    Other orders  -     Fluticasone Furoate-Vilanterol (Breo Ellipta) 200-25 MCG/INH inhaler; Inhale 1 puff Daily. Rinse mouth with water after use.  Dispense: 1 each; Refill: 5           Return in about 4 months (around 12/10/2022) for Recheck, For Darlene Landis).    DISCUSSION (if any):  It appears that his symptoms of asthma are under good control with the current regimen.    Patient's medications for underlying asthma were reviewed in great detail.    Compliance with medications stressed.     Side effects of prescribed medications discussed with the patient.    The need to continue to be aware of triggers that may cause asthma exacerbation versus progression of disease, was also discussed.    I have discussed asthma action plan with him.    The patient was asked to call this office if the symptoms worsen.    Patient was advised to continue his nasal spray, especially given improvement in symptoms overall.    I reviewed the results of last in-lab sleep study in detail. It was performed in 2021. I informed him that the apnea hypopnea index was 11 / hr. REM AHI was 23/hour or so.    I told the patient that his symptoms are consistent with poorly controlled sleep apnea.    Since the patient is having significant issues, the only realistic option is for him to undergo a full night titration  study.    Since he did well with nasal pillows, he can bring them with him on the night of titration study.     Patient was advised to continue using PAP for at least 4 hours every night.    Patient was advised to call this office with any issues.      Dictated utilizing Dragon dictation.    This document was electronically signed by Ghassan Agustin MD on 08/10/22 at 14:24 EDT

## 2022-08-11 ENCOUNTER — OFFICE VISIT (OUTPATIENT)
Dept: UROLOGY | Facility: CLINIC | Age: 57
End: 2022-08-11

## 2022-08-11 ENCOUNTER — LAB (OUTPATIENT)
Dept: LAB | Facility: HOSPITAL | Age: 57
End: 2022-08-11

## 2022-08-11 VITALS
TEMPERATURE: 97.4 F | OXYGEN SATURATION: 96 % | DIASTOLIC BLOOD PRESSURE: 90 MMHG | WEIGHT: 252 LBS | HEIGHT: 72 IN | SYSTOLIC BLOOD PRESSURE: 124 MMHG | HEART RATE: 74 BPM | BODY MASS INDEX: 34.13 KG/M2

## 2022-08-11 DIAGNOSIS — R79.89 LOW TESTOSTERONE IN MALE: Primary | ICD-10-CM

## 2022-08-11 LAB
HCT VFR BLD AUTO: 45.7 % (ref 37.5–51)
HGB BLD-MCNC: 15.6 G/DL (ref 13–17.7)

## 2022-08-11 PROCEDURE — 84270 ASSAY OF SEX HORMONE GLOBUL: CPT | Performed by: NURSE PRACTITIONER

## 2022-08-11 PROCEDURE — 36415 COLL VENOUS BLD VENIPUNCTURE: CPT | Performed by: NURSE PRACTITIONER

## 2022-08-11 PROCEDURE — 85018 HEMOGLOBIN: CPT | Performed by: NURSE PRACTITIONER

## 2022-08-11 PROCEDURE — 99214 OFFICE O/P EST MOD 30 MIN: CPT | Performed by: NURSE PRACTITIONER

## 2022-08-11 PROCEDURE — 84402 ASSAY OF FREE TESTOSTERONE: CPT | Performed by: NURSE PRACTITIONER

## 2022-08-11 PROCEDURE — 84403 ASSAY OF TOTAL TESTOSTERONE: CPT | Performed by: NURSE PRACTITIONER

## 2022-08-11 PROCEDURE — 85014 HEMATOCRIT: CPT | Performed by: NURSE PRACTITIONER

## 2022-08-11 RX ORDER — TESTOSTERONE CYPIONATE 200 MG/ML
200 INJECTION, SOLUTION INTRAMUSCULAR
Qty: 2 ML | Refills: 0 | Status: SHIPPED | OUTPATIENT
Start: 2022-08-11 | End: 2022-09-01

## 2022-08-11 NOTE — PROGRESS NOTES
Office Visit Low Testosterone      Patient Name: Charly Braun  : 1965   MRN: [id number]     Chief Complaint:   Chief Complaint   Patient presents with   • Hypogonadism     New pt.   • Establish Care       Referring Provider: Марина Foster APRN    History of Present Illness: Charly Bruan is a 57 y.o. male who presents today with low testosterone.  The serum test was collected due to the following complaints: fatigue, low libido and ED. The fatigue bothers him most    Low libido   yes  Low energy   yes  Poor sleep   no  Mental clarity issues  no    History of depression? yes  Erectile dysfunction?  yes    Any potential interest in conception?  no    Objective      Review of System:   Constitutional: No fevers or chills      Past Medical History:   Past Medical History:   Diagnosis Date   • Acid reflux    • Arthritis    • Back pain    • Depression    • Essential hypertension 2019   • GERD (gastroesophageal reflux disease)    • Hypertension        Past Surgical History:   Past Surgical History:   Procedure Laterality Date   • CARDIAC CATHETERIZATION N/A 2019    Procedure: Left Heart Cath;  Surgeon: Karen Garcia MD;  Location: [facility] INVASIVE LOCATION;  Service: Cardiology   • GALLBLADDER SURGERY         Family History:   Family History   Problem Relation Age of Onset   • Hypertension Mother    • COPD Father        Social History:   Social History     Socioeconomic History   • Marital status:    Tobacco Use   • Smoking status: Never Smoker   • Smokeless tobacco: Never Used   Substance and Sexual Activity   • Alcohol use: No   • Drug use: No   • Sexual activity: Defer       Medications:     Current Outpatient Medications:   •  albuterol sulfate HFA (Ventolin HFA) 108 (90 Base) MCG/ACT inhaler, Inhale 2 puffs Every 4 (Four) Hours As Needed for Wheezing or Shortness of Air., Disp: 18 g, Rfl: 5  •  allopurinol (ZYLOPRIM) 100 MG tablet, Take 100 mg by mouth Daily.,  Disp: , Rfl:   •  atorvastatin (LIPITOR) 20 MG tablet, Take 20 mg by mouth Daily., Disp: , Rfl:   •  cyclobenzaprine (FLEXERIL) 10 MG tablet, , Disp: , Rfl:   •  FLUoxetine (PROzac) 40 MG capsule, Take 40 mg by mouth Daily., Disp: , Rfl:   •  fluticasone (FLONASE) 50 MCG/ACT nasal spray, 2 sprays into the nostril(s) as directed by provider Daily., Disp: 16 g, Rfl: 5  •  Fluticasone Furoate-Vilanterol (Breo Ellipta) 200-25 MCG/INH inhaler, Inhale 1 puff Daily. Rinse mouth with water after use., Disp: 1 each, Rfl: 5  •  HYDROcodone-acetaminophen (NORCO) 7.5-325 MG per tablet, Take 1 tablet by mouth Every 6 (Six) Hours As Needed for Moderate Pain ., Disp: , Rfl:   •  ibuprofen (ADVIL,MOTRIN) 800 MG tablet, Take 800 mg by mouth As Needed., Disp: , Rfl: 0  •  losartan (COZAAR) 25 MG tablet, Take 25 mg by mouth Daily., Disp: , Rfl:   •  pantoprazole (PROTONIX) 40 MG EC tablet, Take 40 mg by mouth Daily., Disp: , Rfl:   •  vitamin B-12 (CYANOCOBALAMIN) 1000 MCG tablet, TAKE 1 TABLET BY MOUTH 1 TIME, Disp: , Rfl:   •  azithromycin (ZITHROMAX) 250 MG tablet, TAKE AS DIRECTED ON PACK, Disp: , Rfl:   •  bisoprolol (ZEBeta) 5 MG tablet, TAKE 1 AND 1/2 TABLETS BY MOUTH DAILY, Disp: 135 tablet, Rfl: 0  •  colchicine 0.6 MG tablet, TAKE 2 TABLETS BY MOUTH TO START THEN 1 MORE TABLET IN 1 HOUR AS DIRECTED, Disp: , Rfl:   •  indomethacin (INDOCIN) 50 MG capsule, TAKE 1 CAPSULE BY MOUTH THREE TIMES DAILY WITH FOOD, Disp: , Rfl:   •  indomethacin (INDOCIN) 50 MG capsule, Take 50 mg by mouth As Needed. Pt states he takes when needed, Disp: , Rfl:   •  lisinopril (PRINIVIL,ZESTRIL) 10 MG tablet, Take 10 mg by mouth Daily., Disp: , Rfl:   •  loratadine (CLARITIN) 10 MG tablet, Take 10 mg by mouth Daily., Disp: , Rfl: 0  •  montelukast (SINGULAIR) 10 MG tablet, Take 10 mg by mouth Every Evening., Disp: , Rfl:   •  pantoprazole (PROTONIX) 20 MG EC tablet, Take 20 mg by mouth Daily., Disp: , Rfl:   •  predniSONE (DELTASONE) 20 MG tablet,  "Take 3 tabs on day 1-2, take 2 tabs on day 3-4, take 1 tab on day 5-7, Disp: , Rfl:     Allergies:   No Known Allergies    Objective     Physical Exam:   Vital Signs:   Vitals:    08/11/22 1217   BP: 124/90   BP Location: Left arm   Patient Position: Sitting   Cuff Size: Adult   Pulse: 74   Temp: 97.4 °F (36.3 °C)   TempSrc: Temporal   SpO2: 96%   Weight: 114 kg (252 lb)   Height: 182.9 cm (72\")     Body mass index is 34.18 kg/m².     Constitutional: NAD, WDWN.   Neurological: A + O x 3.     Skin: Pink, warm and dry.  No rashes noted.      Labs  No results found for: TESTOSTERONE    No results found for: PSA    Lab Results   Component Value Date    WBC 5.10 06/24/2019    HGB 13.6 06/24/2019    HCT 40 06/24/2019    MCV 96.1 06/24/2019     06/24/2019       Assessment / Plan    Assessment  Mr. Braun is a 57 y.o. male with low testosterone.  Today we discussed the role testosterone plays for a male patient. I have indicated that low testosterone can be associated with metabolic syndrome, erectile dysfunction, coronary artery disease, diabetes, depression, osteoporosis, fatigue, low sex drive, poor sleep, high cholesterol, increased abdominal fat, muscle loss, irritability, hot flashes, and inability to concentrate.     Treatment options were discussed including SERMs, aromatase inhibitors, topical gel or patch, oral troches, nasal spray, testosterone injections every 2-3 weeks, long-acting injections every 10 weeks, and testosterone pellets placed in clinic every 4-6 months.    I explained the risks, benefits, and alternatives to testosterone therapies. I informed him of the potential SEs of chest and leg swelling, increased acne, change in mood, polycythemia, and worsening of undiagnosed prostate cancer.     I gave patient education on self injection at home. He feels confident in doing this himself   Plan  1.  He wishes to proceed with a trial of testosterone injections every 2 weeks.  2. F/U 3 mos with labs "     Follow Up:   No follow-ups on file.    MARGOT Vaughan  [facility]

## 2022-08-14 LAB
SHBG SERPL-SCNC: 21.1 NMOL/L (ref 19.3–76.4)
TESTOST FREE SERPL-MCNC: 4.7 PG/ML (ref 7.2–24)
TESTOST SERPL-MCNC: 267 NG/DL (ref 264–916)

## 2022-08-25 ENCOUNTER — HOSPITAL ENCOUNTER (OUTPATIENT)
Dept: SLEEP MEDICINE | Facility: HOSPITAL | Age: 57
End: 2022-08-25

## 2022-09-01 DIAGNOSIS — R79.89 LOW TESTOSTERONE IN MALE: ICD-10-CM

## 2022-09-01 RX ORDER — TESTOSTERONE CYPIONATE 200 MG/ML
INJECTION, SOLUTION INTRAMUSCULAR
Qty: 2 ML | Refills: 0 | Status: SHIPPED | OUTPATIENT
Start: 2022-09-01 | End: 2022-10-03

## 2022-09-14 NOTE — PROGRESS NOTES
[facility] Cardiology    Patient ID: Charly Braun is a 57 y.o. male.  : 1965   Contact: [phone]    Encounter date: 09/15/2022    PCP: Марина Foster APRN      Chief complaint:   Chief Complaint   Patient presents with   • Palpitations       Problem List:  1. Palpitations:  a. 48h Holter, 06/10/2019: Rare PAC's, occasional PVC's, one 6-beat run of VT.  b. Normal LHC, 2019.  c. Echo, 2019: EF 60%. Trace-to-mild MR/TR.  2. Hypertension  3. GERD  4. COPD  1. 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 Depression       Allergies   Allergen Reactions   • Tall Ragweed Irritability       Current Medications:    Current Outpatient Medications:   •  albuterol sulfate HFA (Ventolin HFA) 108 (90 Base) MCG/ACT inhaler, Inhale 2 puffs Every 4 (Four) Hours As Needed for Wheezing or Shortness of Air., Disp: 18 g, Rfl: 5  •  allopurinol (ZYLOPRIM) 100 MG tablet, Take 100 mg by mouth Daily., Disp: , Rfl:   •  atorvastatin (LIPITOR) 20 MG tablet, Take 20 mg by mouth Daily., Disp: , Rfl:   •  bisoprolol (ZEBeta) 5 MG tablet, TAKE 1 AND 1/2 TABLETS BY MOUTH DAILY, Disp: 135 tablet, Rfl: 0  •  cyclobenzaprine (FLEXERIL) 10 MG tablet, , Disp: , Rfl:   •  FLUoxetine (PROzac) 40 MG capsule, Take 40 mg by mouth Daily., Disp: , Rfl:   •  fluticasone (FLONASE) 50 MCG/ACT nasal spray, 2 sprays into the nostril(s) as directed by provider Daily., Disp: 16 g, Rfl: 5  •  Fluticasone Furoate-Vilanterol (Breo Ellipta) 200-25 MCG/INH inhaler, Inhale 1 puff Daily. Rinse mouth with water after use., Disp: 1 each, Rfl: 5  •  HYDROcodone-acetaminophen (NORCO) 7.5-325 MG per tablet, Take 1 tablet by mouth Every 6 (Six) Hours As Needed for Moderate Pain ., Disp: , Rfl:   •  ibuprofen (ADVIL,MOTRIN) 800 MG tablet, Take 800 mg by mouth As Needed., Disp: , Rfl: 0  •  losartan (COZAAR) 25 MG tablet, Take 25 mg by mouth Daily., Disp: , Rfl:   •  pantoprazole (PROTONIX) 40 MG EC tablet, Take 40 mg  "by mouth Daily., Disp: , Rfl:   •  Testosterone Cypionate (DEPOTESTOTERONE CYPIONATE) 200 MG/ML injection, ADMINISTER 1 ML IN THE MUSCLE EVERY 14 DAYS AS DIRECTED BY PRESCRIBER, Disp: 2 mL, Rfl: 0  •  vitamin B-12 (CYANOCOBALAMIN) 1000 MCG tablet, TAKE 1 TABLET BY MOUTH 1 TIME, Disp: , Rfl:   •  azithromycin (ZITHROMAX) 250 MG tablet, TAKE AS DIRECTED ON PACK, Disp: , Rfl:   •  colchicine 0.6 MG tablet, TAKE 2 TABLETS BY MOUTH TO START THEN 1 MORE TABLET IN 1 HOUR AS DIRECTED, Disp: , Rfl:   •  Needle, Disp, 18G X 1-1/2\" misc, Use for drawing up the medication, Disp: 50 each, Rfl: 3  •  Needle, Disp, 23G X 1-1/2\" misc, 1 Device 1 (One) Time Per Week., Disp: 30 each, Rfl: 11    HPI    Charly Braun is a 57 y.o. male who presents today for a follow up of palpitations, nonsustained ventricular tachycardia, premature ventricular contractions, and cardiac risk factors. Since last visit, he states he has had trouble breathing recently. He is now on Breo and an inhaler. He states that he has not had to use the inhaler since initiating. Patient follows up with Dr. Agustin in [place]. He mentions he has lost about 60 pounds since initiating the carnivore diet. He notes his blood pressure has decreased since losing weight. He does not monitor his blood pressure at home. He has cut out of a caffeine in his diet and his palpitations are improving; only having them occasionally. Patient denies chest pain, orthopnea, edema, dizziness, and syncope.      The following portions of the patient's history were reviewed and updated as appropriate: allergies, current medications and problem list.    Pertinent positives as listed in the HPI.  All other systems reviewed are negative.         Vitals:    09/15/22 0927   BP: 114/86   BP Location: Right arm   Patient Position: Sitting   Pulse: 83   SpO2: 94%   Weight: 112 kg (246 lb 12.8 oz)   Height: 180.3 cm (71\")       Physical Exam:  General: Alert and oriented.  Neck: Jugular venous " pressure is within normal limits. Carotids have normal upstrokes without bruits.   Cardiovascular: Heart has a nondisplaced focal PMI. Regular rate and rhythm. No murmur, gallop or rub.  Lungs: Clear, no rales or wheezes. Equal expansion is noted.   Extremities: Show no edema.  Skin: Warm and dry.  Neurologic: Nonfocal.     Diagnostic Data (reviewed with patient):    Lab date: 09/20/2021  · FLP:  , , HDL 37,   · CMP: Glu 98, BUN 24, Creat 1.07, eGFR 91, Na 138, K 4.3, Cl 103, CO2 26, Ca 9.1, Alk Phos 45, AST 34, ALT 50  · CBC: HGB 14.4, HCT 43.2, MCV 96.9, MCH 32.3,       Procedures      Assessment:    ICD-10-CM ICD-9-CM   1. Palpitations  R00.2 785.1   2. NSVT (nonsustained ventricular tachycardia) (HCC)  I47.2 427.1   3. PVC (premature ventricular contraction)  I49.3 427.69   4. Essential hypertension  I10 401.9         Plan:  1. Obtain a blood pressure cuff and begin to routinely monitoring blood pressure at home with a goal for systolic to be <140 mmHg. If patient begins to get low readings, we may need to reduce/stop losartan  2. Continue on atorvastatin 20 mg daily for hyperlipidemia.    3. Continue on bisoprolol 5 mg daily for rate control and hypertension.    4. Continue on losartan 25 mg daily for hypertension.    5. Continue all other current medications.  6. F/up in 12 months, sooner if needed.      Scribed for Karen Garcia MD by Rebekah Steiner. 9/15/2022 09:39 EDT       I Karen Garcia MD personally performed the services described in this documentation as scribed by the above individual in my presence, and it is both accurate and complete.    Karen Garcia MD, FACC

## 2022-09-15 ENCOUNTER — OFFICE VISIT (OUTPATIENT)
Dept: CARDIOLOGY | Facility: CLINIC | Age: 57
End: 2022-09-15

## 2022-09-15 VITALS
HEART RATE: 83 BPM | OXYGEN SATURATION: 94 % | WEIGHT: 246.8 LBS | BODY MASS INDEX: 34.55 KG/M2 | DIASTOLIC BLOOD PRESSURE: 86 MMHG | HEIGHT: 71 IN | SYSTOLIC BLOOD PRESSURE: 114 MMHG

## 2022-09-15 DIAGNOSIS — R00.2 PALPITATIONS: Primary | ICD-10-CM

## 2022-09-15 DIAGNOSIS — I47.29 NSVT (NONSUSTAINED VENTRICULAR TACHYCARDIA): ICD-10-CM

## 2022-09-15 DIAGNOSIS — I49.3 PVC (PREMATURE VENTRICULAR CONTRACTION): ICD-10-CM

## 2022-09-15 DIAGNOSIS — I10 ESSENTIAL HYPERTENSION: ICD-10-CM

## 2022-09-15 PROCEDURE — 99213 OFFICE O/P EST LOW 20 MIN: CPT | Performed by: INTERNAL MEDICINE

## 2022-10-01 DIAGNOSIS — R79.89 LOW TESTOSTERONE IN MALE: ICD-10-CM

## 2022-10-03 DIAGNOSIS — N40.0 BENIGN PROSTATIC HYPERPLASIA, UNSPECIFIED WHETHER LOWER URINARY TRACT SYMPTOMS PRESENT: ICD-10-CM

## 2022-10-03 DIAGNOSIS — Z12.5 SCREENING PSA (PROSTATE SPECIFIC ANTIGEN): Primary | ICD-10-CM

## 2022-10-03 RX ORDER — TESTOSTERONE CYPIONATE 200 MG/ML
INJECTION, SOLUTION INTRAMUSCULAR
Qty: 2 ML | Refills: 0 | Status: SHIPPED | OUTPATIENT
Start: 2022-10-03 | End: 2022-11-03

## 2022-10-08 DIAGNOSIS — E66.01 MORBIDLY OBESE: ICD-10-CM

## 2022-10-10 RX ORDER — BISOPROLOL FUMARATE 5 MG/1
TABLET, FILM COATED ORAL
Qty: 135 TABLET | Refills: 3 | Status: SHIPPED | OUTPATIENT
Start: 2022-10-10

## 2022-10-17 ENCOUNTER — HOSPITAL ENCOUNTER (OUTPATIENT)
Dept: ULTRASOUND IMAGING | Facility: HOSPITAL | Age: 57
Discharge: HOME | End: 2022-10-17
Payer: MEDICARE

## 2022-10-17 DIAGNOSIS — Q61.00 CONGENITAL RENAL CYST: ICD-10-CM

## 2022-10-17 PROCEDURE — 76770 US EXAM ABDO BACK WALL COMP: CPT

## 2022-10-18 ENCOUNTER — HOSPITAL ENCOUNTER (OUTPATIENT)
Dept: SLEEP MEDICINE | Facility: HOSPITAL | Age: 57
Discharge: HOME | End: 2022-10-18
Admitting: INTERNAL MEDICINE

## 2022-10-18 DIAGNOSIS — E66.9 OBESITY (BMI 30-39.9): ICD-10-CM

## 2022-10-18 DIAGNOSIS — G47.33 OBSTRUCTIVE SLEEP APNEA: ICD-10-CM

## 2022-10-18 PROCEDURE — 95811 POLYSOM 6/>YRS CPAP 4/> PARM: CPT | Performed by: INTERNAL MEDICINE

## 2022-10-18 PROCEDURE — 95811 POLYSOM 6/>YRS CPAP 4/> PARM: CPT

## 2022-10-21 DIAGNOSIS — G47.33 OSA (OBSTRUCTIVE SLEEP APNEA): Primary | ICD-10-CM

## 2022-10-27 ENCOUNTER — OFFICE VISIT (OUTPATIENT)
Dept: GASTROENTEROLOGY | Facility: CLINIC | Age: 57
End: 2022-10-27

## 2022-10-27 VITALS
WEIGHT: 250 LBS | BODY MASS INDEX: 35 KG/M2 | DIASTOLIC BLOOD PRESSURE: 80 MMHG | HEIGHT: 71 IN | SYSTOLIC BLOOD PRESSURE: 130 MMHG | HEART RATE: 76 BPM | OXYGEN SATURATION: 97 %

## 2022-10-27 DIAGNOSIS — E66.9 CLASS 1 OBESITY WITH BODY MASS INDEX (BMI) OF 34.0 TO 34.9 IN ADULT, UNSPECIFIED OBESITY TYPE, UNSPECIFIED WHETHER SERIOUS COMORBIDITY PRESENT: ICD-10-CM

## 2022-10-27 DIAGNOSIS — R74.8 ELEVATED LIVER ENZYMES: ICD-10-CM

## 2022-10-27 DIAGNOSIS — K21.9 GASTROESOPHAGEAL REFLUX DISEASE, UNSPECIFIED WHETHER ESOPHAGITIS PRESENT: ICD-10-CM

## 2022-10-27 DIAGNOSIS — K76.0 NAFLD (NONALCOHOLIC FATTY LIVER DISEASE): Primary | ICD-10-CM

## 2022-10-27 DIAGNOSIS — Z80.0 FAMILY HISTORY OF COLON CANCER: ICD-10-CM

## 2022-10-27 PROCEDURE — 99204 OFFICE O/P NEW MOD 45 MIN: CPT | Performed by: PHYSICIAN ASSISTANT

## 2022-10-27 RX ORDER — SODIUM, POTASSIUM,MAG SULFATES 17.5-3.13G
SOLUTION, RECONSTITUTED, ORAL ORAL
Qty: 354 ML | Refills: 0 | Status: SHIPPED | OUTPATIENT
Start: 2022-10-27 | End: 2023-01-16 | Stop reason: HOSPADM

## 2022-10-27 RX ORDER — ACETAMINOPHEN 160 MG
2000 TABLET,DISINTEGRATING ORAL DAILY
COMMUNITY
Start: 2022-10-13

## 2022-10-27 RX ORDER — SODIUM CHLORIDE 9 MG/ML
30 INJECTION, SOLUTION INTRAVENOUS CONTINUOUS PRN
Status: CANCELLED | OUTPATIENT
Start: 2022-10-27

## 2022-10-27 NOTE — PROGRESS NOTES
New Patient Consult      Date: 10/27/2022   Patient Name: Charly Braun  MRN: [id number]  : 1965     Primary Care Provider: Марина Foster APRN  Referring Provider: Kristin    Chief Complaint   Patient presents with   • Hepatic Disease     History of Present Illness: Charly Braun is a 57 y.o. male who is here today as a consultation with Gastroenterology for evaluation of Hepatic Disease.    He was told recently that he has elevated liver enzymes. He had an ultrasound a few months ago which showed fatty liver. He has been trying to lose weight over the past couple of months and states that his liver enzymes improved some. He denies any past history of alcoholism, no current alcohol use. He does have history of elevated cholesterol, taking atorvastatin. No prior history of diabetes.     His last colonoscopy was in  by Dr. Vega. His brother had colon cancer when he was in his 20s. He is having BMs daily, no rectal bleeding. No abdominal pain.     He has long history of reflux. Was previously taking protonix 20 mg once daily and a couple of years ago was increased to 40 mg once daily. He has continued it daily since then. Prior to starting this medication he had significant reflux symptoms. No current heartburn now. No dysphagia. No prior EGD.     Subjective      Past Medical History:   Diagnosis Date   • Acid reflux    • Arthritis    • Back pain    • Depression    • Essential hypertension 2019   • GERD (gastroesophageal reflux disease)    • Hyperlipidemia    • Hypertension      Past Surgical History:   Procedure Laterality Date   • CARDIAC CATHETERIZATION N/A 2019    Procedure: Left Heart Cath;  Surgeon: Karen Garcia MD;  Location: [facility] INVASIVE LOCATION;  Service: Cardiology   • COLONOSCOPY      Dr. Zain randhawa   • GALLBLADDER SURGERY       Family History   Problem Relation Age of Onset   • Hypertension Mother    • COPD Father    • Colon polyps  "Father    • Colon cancer Brother    • Heart disease Brother      Social History     Socioeconomic History   • Marital status:    Tobacco Use   • Smoking status: Never   • Smokeless tobacco: Never   Vaping Use   • Vaping Use: Never used   Substance and Sexual Activity   • Alcohol use: No   • Drug use: No   • Sexual activity: Defer     Current Outpatient Medications:   •  albuterol sulfate HFA (Ventolin HFA) 108 (90 Base) MCG/ACT inhaler, Inhale 2 puffs Every 4 (Four) Hours As Needed for Wheezing or Shortness of Air., Disp: 18 g, Rfl: 5  •  allopurinol (ZYLOPRIM) 100 MG tablet, Take 100 mg by mouth Daily., Disp: , Rfl:   •  atorvastatin (LIPITOR) 20 MG tablet, Take 20 mg by mouth Daily., Disp: , Rfl:   •  bisoprolol (ZEBeta) 5 MG tablet, TAKE 1 AND 1/2 TABLETS BY MOUTH DAILY, Disp: 135 tablet, Rfl: 3  •  Breo Ellipta 200-25 MCG/INH inhaler, INHALE 1 PUFF BY MOUTH DAILY. RINSE MOUTH WITH WATER AFTER USE, Disp: 60 each, Rfl: 3  •  Cholecalciferol (Vitamin D3) 50 MCG (2000 UT) capsule, Take 1 capsule by mouth Daily., Disp: , Rfl:   •  cyclobenzaprine (FLEXERIL) 10 MG tablet, , Disp: , Rfl:   •  FLUoxetine (PROzac) 40 MG capsule, Take 40 mg by mouth Daily., Disp: , Rfl:   •  fluticasone (FLONASE) 50 MCG/ACT nasal spray, 2 sprays into the nostril(s) as directed by provider Daily., Disp: 16 g, Rfl: 5  •  HYDROcodone-acetaminophen (NORCO) 7.5-325 MG per tablet, Take 1 tablet by mouth Every 6 (Six) Hours As Needed for Moderate Pain ., Disp: , Rfl:   •  ibuprofen (ADVIL,MOTRIN) 800 MG tablet, Take 800 mg by mouth As Needed., Disp: , Rfl: 0  •  losartan (COZAAR) 25 MG tablet, Take 25 mg by mouth Daily., Disp: , Rfl:   •  Needle, Disp, 18G X 1-1/2\" misc, Use for drawing up the medication, Disp: 50 each, Rfl: 3  •  Needle, Disp, 23G X 1-1/2\" misc, 1 Device 1 (One) Time Per Week., Disp: 30 each, Rfl: 11  •  pantoprazole (PROTONIX) 40 MG EC tablet, Take 40 mg by mouth Daily., Disp: , Rfl:   •  Testosterone Cypionate " (DEPOTESTOTERONE CYPIONATE) 200 MG/ML injection, ADMINISTER 1 ML IN THE MUSCLE EVERY 14 DAYS AS DIRECTED BY PRESCRIBER, Disp: 2 mL, Rfl: 0  •  vitamin B-12 (CYANOCOBALAMIN) 1000 MCG tablet, TAKE 1 TABLET BY MOUTH 1 TIME, Disp: , Rfl:   •  azithromycin (ZITHROMAX) 250 MG tablet, TAKE AS DIRECTED ON PACK, Disp: , Rfl:   •  colchicine 0.6 MG tablet, TAKE 2 TABLETS BY MOUTH TO START THEN 1 MORE TABLET IN 1 HOUR AS DIRECTED, Disp: , Rfl:     Allergies   Allergen Reactions   • Tall Ragweed Irritability   • Lisinopril Cough     The following portions of the patient's history were reviewed and updated as appropriate: allergies, current medications, past family history, past medical history, past social history, past surgical history and problem list.    Objective     Physical Exam  Vitals reviewed.   Constitutional:       General: He is not in acute distress.     Appearance: Normal appearance. He is well-developed. He is not ill-appearing or diaphoretic.   HENT:      Head: Normocephalic and atraumatic.      Right Ear: External ear normal.      Left Ear: External ear normal.      Nose: Nose normal.      Mouth/Throat:      Comments: Wearing a mask  Eyes:      General: No scleral icterus.        Right eye: No discharge.         Left eye: No discharge.      Conjunctiva/sclera: Conjunctivae normal.   Neck:      Vascular: No JVD.   Cardiovascular:      Rate and Rhythm: Normal rate and regular rhythm.      Heart sounds: Normal heart sounds. No murmur heard.    No friction rub. No gallop.   Pulmonary:      Effort: Pulmonary effort is normal. No respiratory distress.      Breath sounds: Normal breath sounds. No wheezing or rales.   Chest:      Chest wall: No tenderness.   Abdominal:      General: Bowel sounds are normal. There is no distension.      Palpations: Abdomen is soft. There is no mass.      Tenderness: There is no abdominal tenderness. There is no guarding.   Musculoskeletal:         General: No deformity. Normal range of  "motion.      Cervical back: Normal range of motion.   Skin:     General: Skin is warm and dry.      Findings: No erythema or rash.   Neurological:      Mental Status: He is alert and oriented to person, place, and time.      Coordination: Coordination normal.   Psychiatric:         Mood and Affect: Mood normal.         Behavior: Behavior normal.         Thought Content: Thought content normal.         Judgment: Judgment normal.         Vitals:    10/27/22 1308   BP: 130/80   Pulse: 76   SpO2: 97%   Weight: 113 kg (250 lb)   Height: 180.3 cm (71\")     Results Review:   I have reviewed the patient's new clinical and imaging results.    US gb 7/2022:   Liver: The liver is diffusely increased in echogenicity and homogeneous in echotexture. No focal liver lesion.   Biliary/CBD: 5 mm. No intra or extrahepatic ductal dilatation.   Gallbladder: Surgically absent.   Pancreas: Obscured by overlying bowel gas.   Right kidney: 12.7 cm in length.  Normal parenchymal echogenicity.  No hydronephrosis. Small bilateral 9 mm cyst in the inferior right kidney.   Impression:  1.  Hepatic steatosis.   2.  Status post cholecystectomy.    Labs 7/2022: HgbA1c 5.9, B12 279, folate 15.0, hgb 15.6, HCT 47.4, MCV 96.3, platelets 309,000, albumin 4.3, alk phos 56, Bili 0.7, Cr 1.11, GFR 78, ALT 56, AST 41.     Labs 10/2022: ALT 51, AST 41.    Assessment / Plan      1. NAFLD (nonalcoholic fatty liver disease)  2. Elevated liver enzymes  3. Class 1 obesity with body mass index (BMI) of 34.0 to 34.9 in adult, unspecified obesity type, unspecified whether serious comorbidity present  He has known fatty liver disease, found on imaging earlier this year. He has mildly elevated liver enzymes. Cholesterol has been elevated in the past, recently normal on atorvastatin. His BMI is 34 and he has been trying to lose weight already with diet modifications. He is a nonalcoholic. Suspect mildly elevated liver enzymes in hepatocellular pattern due to his fatty " liver disease. Expect improvement in liver enzymes as he loses more weight.     Work on weight loss, goal to lose 25 lbs in the next 6 months  Mediterranean diet  Labs prior to next visit with PCP (will review next visit)    4. Family history of colon cancer  His brother had colon cancer when he was very young (in his 20s) and has colectomy. His father had colon polyps. Patient's last colonoscopy was in 2014 and normal. He should have a colonoscopy every 5 years due to his family history, will arrange now.     He will have a colonoscopy performed with monitored anesthesia care. The indications, technique, alternatives and potential risk and complications were discussed with the patient including but not limited to bleeding, bowel perforations, missing lesions and anesthetic complications. The patient understands and wishes to proceed with the procedure and has given their verbal consent. Written patient education information was given to the patient. He should follow up in the office after this procedure to discuss the results and further recommendations can be made at that time. The patient will call if they have further questions before procedure.  - Case Request  - sodium-potassium-magnesium sulfates (SUPREP) 17.5-3.13-1.6 GM/177ML solution oral solution; Take 2 bottles by mouth 1 (One) Time for 1 dose. Please see prep instructions from office  Dispense: 354 mL; Refill: 0    5. Gastroesophageal reflux disease, unspecified whether esophagitis present  He has had GERD symptoms for years. In the past, when he was heavier, he was on protonix 20 mg once daily without control of symptoms. He has been taking protonix 40 mg once daily for a few years now. No current heartburn symptoms. No prior EGD. No reported dysphagia.     Work on weight loss  Avoid NSAIDs when possible  Discontinue daily PPI, take only PRN heartburn  If he has difficulty dc PPI, consider EGD          Follow Up:   Return for follow up after procedure  to discuss results.      Jolynn Hayes PA-C  [facility]  10/27/2022  14:01 EDT    Dictated Utilizing Dragon Dictation: Part of this note may be an electronic transcription/translation of spoken language to printed text using the Dragon Dictation System.

## 2022-11-02 PROBLEM — Z80.0 FAMILY HISTORY OF COLON CANCER: Status: ACTIVE | Noted: 2022-11-02

## 2022-11-03 DIAGNOSIS — R79.89 LOW TESTOSTERONE IN MALE: ICD-10-CM

## 2022-11-03 RX ORDER — TESTOSTERONE CYPIONATE 200 MG/ML
INJECTION, SOLUTION INTRAMUSCULAR
Qty: 2 ML | Refills: 0 | Status: SHIPPED | OUTPATIENT
Start: 2022-11-03 | End: 2022-11-11 | Stop reason: SDUPTHER

## 2022-11-11 ENCOUNTER — OFFICE VISIT (OUTPATIENT)
Dept: UROLOGY | Facility: CLINIC | Age: 57
End: 2022-11-11

## 2022-11-11 VITALS
HEIGHT: 71 IN | DIASTOLIC BLOOD PRESSURE: 68 MMHG | TEMPERATURE: 98.2 F | OXYGEN SATURATION: 97 % | WEIGHT: 248 LBS | SYSTOLIC BLOOD PRESSURE: 128 MMHG | HEART RATE: 70 BPM | BODY MASS INDEX: 34.72 KG/M2

## 2022-11-11 DIAGNOSIS — Z12.5 SCREENING PSA (PROSTATE SPECIFIC ANTIGEN): ICD-10-CM

## 2022-11-11 DIAGNOSIS — R79.89 LOW TESTOSTERONE IN MALE: Primary | ICD-10-CM

## 2022-11-11 DIAGNOSIS — N40.0 BENIGN PROSTATIC HYPERPLASIA, UNSPECIFIED WHETHER LOWER URINARY TRACT SYMPTOMS PRESENT: ICD-10-CM

## 2022-11-11 PROCEDURE — 99214 OFFICE O/P EST MOD 30 MIN: CPT | Performed by: NURSE PRACTITIONER

## 2022-11-11 RX ORDER — TESTOSTERONE CYPIONATE 200 MG/ML
100 INJECTION, SOLUTION INTRAMUSCULAR WEEKLY
Qty: 2 ML | Refills: 0 | Status: SHIPPED | OUTPATIENT
Start: 2022-11-11 | End: 2022-12-06

## 2022-11-11 RX ORDER — SYRINGE WITH NEEDLE, 1 ML 25GX5/8"
SYRINGE, EMPTY DISPOSABLE MISCELLANEOUS
COMMUNITY
Start: 2022-10-27

## 2022-11-11 NOTE — PROGRESS NOTES
Office Visit Established Male Patient     Patient Name: Charly Braun  : 1965   MRN: [id number]     Chief Complaint:   Chief Complaint   Patient presents with   • Follow-up     Follow-up low testosterone       History of Present Illness: Mr. Charly Braun is a 57 y.o. male who presents today for follow up with hypogonadism.  Patient has been taking testosterone 200 mg every 14 days.  He reports overall feeling great improvement in fatigue but he does notice the closer it gets for his next dose his symptoms return.      Subjective      Review of System:   Constitutional: No fevers or chills      Past Medical History:   Past Medical History:   Diagnosis Date   • Acid reflux    • Arthritis    • Back pain    • Depression    • Essential hypertension 2019   • GERD (gastroesophageal reflux disease)    • Hyperlipidemia    • Hypertension        Past Surgical History:   Past Surgical History:   Procedure Laterality Date   • CARDIAC CATHETERIZATION N/A 2019    Procedure: Left Heart Cath;  Surgeon: Karen Garcia MD;  Location: [facility] INVASIVE LOCATION;  Service: Cardiology   • COLONOSCOPY      Dr. Spring- [facility]   • GALLBLADDER SURGERY         Family History:   Family History   Problem Relation Age of Onset   • Hypertension Mother    • COPD Father    • Colon polyps Father    • Colon cancer Brother    • Heart disease Brother        Social History:   Social History     Socioeconomic History   • Marital status:    Tobacco Use   • Smoking status: Never   • Smokeless tobacco: Never   Vaping Use   • Vaping Use: Never used   Substance and Sexual Activity   • Alcohol use: No   • Drug use: No   • Sexual activity: Defer       Medications:     Current Outpatient Medications:   •  albuterol sulfate HFA (Ventolin HFA) 108 (90 Base) MCG/ACT inhaler, Inhale 2 puffs Every 4 (Four) Hours As Needed for Wheezing or Shortness of Air., Disp: 18 g, Rfl: 5  •  allopurinol (ZYLOPRIM) 100 MG  "tablet, Take 100 mg by mouth Daily., Disp: , Rfl:   •  atorvastatin (LIPITOR) 20 MG tablet, Take 20 mg by mouth Daily., Disp: , Rfl:   •  B-D 3CC LUER-ZELALEM SYR 25GX1\" 25G X 1\" 3 ML misc, USE ONCE A WEEK, Disp: , Rfl:   •  bisoprolol (ZEBeta) 5 MG tablet, TAKE 1 AND 1/2 TABLETS BY MOUTH DAILY, Disp: 135 tablet, Rfl: 3  •  Breo Ellipta 200-25 MCG/INH inhaler, INHALE 1 PUFF BY MOUTH DAILY. RINSE MOUTH WITH WATER AFTER USE, Disp: 60 each, Rfl: 3  •  Cholecalciferol (Vitamin D3) 50 MCG (2000 UT) capsule, Take 1 capsule by mouth Daily., Disp: , Rfl:   •  colchicine 0.6 MG tablet, TAKE 2 TABLETS BY MOUTH TO START THEN 1 MORE TABLET IN 1 HOUR AS DIRECTED, Disp: , Rfl:   •  cyclobenzaprine (FLEXERIL) 10 MG tablet, , Disp: , Rfl:   •  FLUoxetine (PROzac) 40 MG capsule, Take 40 mg by mouth Daily., Disp: , Rfl:   •  fluticasone (FLONASE) 50 MCG/ACT nasal spray, 2 sprays into the nostril(s) as directed by provider Daily., Disp: 16 g, Rfl: 5  •  HYDROcodone-acetaminophen (NORCO) 7.5-325 MG per tablet, Take 1 tablet by mouth Every 6 (Six) Hours As Needed for Moderate Pain ., Disp: , Rfl:   •  ibuprofen (ADVIL,MOTRIN) 800 MG tablet, Take 800 mg by mouth As Needed., Disp: , Rfl: 0  •  losartan (COZAAR) 25 MG tablet, Take 25 mg by mouth Daily., Disp: , Rfl:   •  Needle, Disp, 18G X 1-1/2\" misc, Use for drawing up the medication, Disp: 50 each, Rfl: 3  •  Needle, Disp, 23G X 1-1/2\" misc, 1 Device 1 (One) Time Per Week., Disp: 50 each, Rfl: 11  •  sodium-potassium-magnesium sulfates (SUPREP) 17.5-3.13-1.6 GM/177ML solution oral solution, Take 2 bottles by mouth 1 (One) Time for 1 dose. Please see prep instructions from office, Disp: 354 mL, Rfl: 0  •  Testosterone Cypionate (DEPOTESTOTERONE CYPIONATE) 200 MG/ML injection, Inject 0.5 mL into the appropriate muscle as directed by prescriber 1 (One) Time Per Week., Disp: 2 mL, Rfl: 0  •  vitamin B-12 (CYANOCOBALAMIN) 1000 MCG tablet, TAKE 1 TABLET BY MOUTH 1 TIME, Disp: , Rfl: " "    Allergies:   Allergies   Allergen Reactions   • Tall Ragweed Irritability   • Lisinopril Cough       Objective     Physical Exam:   Vital Signs:   Vitals:    11/11/22 1036   BP: 128/68   Pulse: 70   Temp: 98.2 °F (36.8 °C)   SpO2: 97%   Weight: 112 kg (248 lb)   Height: 180.3 cm (71\")     Body mass index is 34.59 kg/m².     Physical Exam  Constitutional: NAD, WDWN.   Neurological: A + O x 3.  Cranial Nerves II-XII grossly intact. Normal gait.  Psychiatric:  Normal mood and affect        Labs  Brief Urine Lab Results     None          Lab Results   Component Value Date    GLUCOSE 98 06/24/2019    CALCIUM 9.4 06/24/2019     06/24/2019    K 3.8 06/24/2019    CO2 24.0 06/24/2019     06/24/2019    BUN 14 06/24/2019    CREATININE 1.00 06/24/2019    EGFRIFNONA 79 06/24/2019    BCR 14.1 06/24/2019    ANIONGAP 15.0 06/24/2019       Lab Results   Component Value Date    WBC 5.10 06/24/2019    HGB 15.6 08/11/2022    HCT 45.7 08/11/2022    MCV 96.1 06/24/2019     06/24/2019           Assessment / Plan      Assessment/Plan:   Diagnoses and all orders for this visit:    1. Low testosterone in male (Primary)  -     Cancel: Estradiol  -     Testosterone Cypionate (DEPOTESTOTERONE CYPIONATE) 200 MG/ML injection; Inject 0.5 mL into the appropriate muscle as directed by prescriber 1 (One) Time Per Week.  Dispense: 2 mL; Refill: 0  -     Needle, Disp, 18G X 1-1/2\" misc; Use for drawing up the medication  Dispense: 50 each; Refill: 3  -     Needle, Disp, 23G X 1-1/2\" misc; 1 Device 1 (One) Time Per Week.  Dispense: 50 each; Refill: 11  -     Estradiol  -     PSA DIAGNOSTIC  -     Testosterone  -     Hemoglobin & Hematocrit, Blood    2. Screening PSA (prostate specific antigen)  -     PSA DIAGNOSTIC    3. Benign prostatic hyperplasia, unspecified whether lower urinary tract symptoms present  -     PSA DIAGNOSTIC    57-year-old here for hypogonadism has been feeling well on testosterone wishes to continue treatment. "  We discussed going to weekly injections opposed to every 14 days to minimize fluctuations in hormone levels which can minimize the return of his fatigue that he is noticing as it gets closer to his next injection.  Labs were not done prior to this visit we will do labs today and decide if we need to titrate dosage.  Today we will check TT, H&H, estradiol, and PSA    TT, H&H 3 months  Discussed ED further at next visit  Testicular exam and RICHY    Follow Up:   Return in about 3 months (around 2/11/2023) for Follow up Shilpa.    MARGOT Vaughan  [facility]

## 2022-11-12 LAB
ESTRADIOL SERPL-MCNC: 60.9 PG/ML (ref 7.6–42.6)
HCT VFR BLD AUTO: 49.9 % (ref 37.5–51)
HGB BLD-MCNC: 16.4 G/DL (ref 13–17.7)
PSA SERPL-MCNC: 1.28 NG/ML (ref 0–4)
TESTOST SERPL-MCNC: 1132 NG/DL (ref 264–916)

## 2022-11-18 DIAGNOSIS — R79.89 LOW TESTOSTERONE IN MALE: Primary | ICD-10-CM

## 2022-11-18 RX ORDER — ANASTROZOLE 1 MG/1
1 TABLET ORAL WEEKLY
Qty: 12 TABLET | Refills: 3 | Status: SHIPPED | OUTPATIENT
Start: 2022-11-18

## 2022-12-05 DIAGNOSIS — R79.89 LOW TESTOSTERONE IN MALE: ICD-10-CM

## 2022-12-06 RX ORDER — TESTOSTERONE CYPIONATE 200 MG/ML
INJECTION, SOLUTION INTRAMUSCULAR
Qty: 2 ML | Refills: 0 | Status: SHIPPED | OUTPATIENT
Start: 2022-12-06 | End: 2023-02-13 | Stop reason: DRUGHIGH

## 2023-01-16 ENCOUNTER — HOSPITAL ENCOUNTER (OUTPATIENT)
Facility: HOSPITAL | Age: 58
Setting detail: HOSPITAL OUTPATIENT SURGERY
Discharge: HOME | End: 2023-01-16
Attending: INTERNAL MEDICINE | Admitting: INTERNAL MEDICINE
Payer: MEDICARE

## 2023-01-16 ENCOUNTER — ANESTHESIA (OUTPATIENT)
Dept: GASTROENTEROLOGY | Facility: HOSPITAL | Age: 58
End: 2023-01-16
Payer: MEDICARE

## 2023-01-16 ENCOUNTER — ANESTHESIA EVENT (OUTPATIENT)
Dept: GASTROENTEROLOGY | Facility: HOSPITAL | Age: 58
End: 2023-01-16
Payer: MEDICARE

## 2023-01-16 VITALS
TEMPERATURE: 97.4 F | WEIGHT: 245 LBS | HEIGHT: 72 IN | OXYGEN SATURATION: 93 % | RESPIRATION RATE: 20 BRPM | BODY MASS INDEX: 33.18 KG/M2 | HEART RATE: 73 BPM | SYSTOLIC BLOOD PRESSURE: 127 MMHG | DIASTOLIC BLOOD PRESSURE: 82 MMHG

## 2023-01-16 DIAGNOSIS — Z80.0 FAMILY HISTORY OF COLON CANCER: ICD-10-CM

## 2023-01-16 PROCEDURE — 94640 AIRWAY INHALATION TREATMENT: CPT

## 2023-01-16 PROCEDURE — 45385 COLONOSCOPY W/LESION REMOVAL: CPT | Performed by: INTERNAL MEDICINE

## 2023-01-16 PROCEDURE — 88305 TISSUE EXAM BY PATHOLOGIST: CPT

## 2023-01-16 PROCEDURE — 25010000002 PROPOFOL 10 MG/ML EMULSION: Performed by: NURSE ANESTHETIST, CERTIFIED REGISTERED

## 2023-01-16 RX ORDER — SIMETHICONE 20 MG/.3ML
EMULSION ORAL AS NEEDED
Status: DISCONTINUED | OUTPATIENT
Start: 2023-01-16 | End: 2023-01-16 | Stop reason: HOSPADM

## 2023-01-16 RX ORDER — PROPOFOL 10 MG/ML
VIAL (ML) INTRAVENOUS AS NEEDED
Status: DISCONTINUED | OUTPATIENT
Start: 2023-01-16 | End: 2023-01-16 | Stop reason: SURG

## 2023-01-16 RX ORDER — LIDOCAINE HYDROCHLORIDE 20 MG/ML
INJECTION, SOLUTION INTRAVENOUS AS NEEDED
Status: DISCONTINUED | OUTPATIENT
Start: 2023-01-16 | End: 2023-01-16 | Stop reason: SURG

## 2023-01-16 RX ORDER — SODIUM CHLORIDE 9 MG/ML
30 INJECTION, SOLUTION INTRAVENOUS CONTINUOUS PRN
Status: DISCONTINUED | OUTPATIENT
Start: 2023-01-16 | End: 2023-01-16 | Stop reason: HOSPADM

## 2023-01-16 RX ORDER — IPRATROPIUM BROMIDE AND ALBUTEROL SULFATE 2.5; .5 MG/3ML; MG/3ML
3 SOLUTION RESPIRATORY (INHALATION) ONCE
Status: DISCONTINUED | OUTPATIENT
Start: 2023-01-16 | End: 2023-01-16 | Stop reason: HOSPADM

## 2023-01-16 RX ORDER — IPRATROPIUM BROMIDE AND ALBUTEROL SULFATE 2.5; .5 MG/3ML; MG/3ML
3 SOLUTION RESPIRATORY (INHALATION) ONCE
Status: COMPLETED | OUTPATIENT
Start: 2023-01-16 | End: 2023-01-16

## 2023-01-16 RX ORDER — PANTOPRAZOLE SODIUM 40 MG/1
40 TABLET, DELAYED RELEASE ORAL DAILY
Qty: 30 TABLET | Refills: 2 | Status: SHIPPED | OUTPATIENT
Start: 2023-01-16

## 2023-01-16 RX ADMIN — SODIUM CHLORIDE 30 ML/HR: 9 INJECTION, SOLUTION INTRAVENOUS at 10:39

## 2023-01-16 RX ADMIN — PROPOFOL 20 MG: 10 INJECTION, EMULSION INTRAVENOUS at 11:42

## 2023-01-16 RX ADMIN — PROPOFOL 140 MCG/KG/MIN: 10 INJECTION, EMULSION INTRAVENOUS at 11:16

## 2023-01-16 RX ADMIN — LIDOCAINE HYDROCHLORIDE 40 MG: 20 INJECTION, SOLUTION INTRAVENOUS at 11:16

## 2023-01-16 RX ADMIN — IPRATROPIUM BROMIDE AND ALBUTEROL SULFATE 3 ML: .5; 3 SOLUTION RESPIRATORY (INHALATION) at 12:19

## 2023-01-16 RX ADMIN — PROPOFOL 20 MG: 10 INJECTION, EMULSION INTRAVENOUS at 11:37

## 2023-01-16 RX ADMIN — PROPOFOL 100 MG: 10 INJECTION, EMULSION INTRAVENOUS at 11:16

## 2023-01-16 NOTE — NURSING NOTE
1321  Dr. Murillo here at 13:07 pm  and informed that pt worried over having heartburn after being told not to take colchicine.   reports that he will call Protonix in for pt at his pharmacy of choice at [facility]. Pt called at home and told that new med Protonix would be called in for him.  RN talked to wife and gave updated.

## 2023-01-16 NOTE — ADDENDUM NOTE
Addendum  created 01/16/23 1208 by Inderjit Delgado CRNA    Order list changed, Pharmacy for encounter modified

## 2023-01-16 NOTE — ANESTHESIA PREPROCEDURE EVALUATION
Anesthesia Evaluation     Patient summary reviewed and Nursing notes reviewed   NPO Solid Status: > 8 hours  NPO Liquid Status: > 8 hours           Airway   Mallampati: II  TM distance: >3 FB  Neck ROM: full  Possible difficult intubation  Dental - normal exam     Pulmonary - normal exam   (-) not a smoker  Cardiovascular - normal exam    ECG reviewed  Patient on routine beta blocker    (+) hypertension 2 medications or greater, dysrhythmias (palpitations, NSVT), hyperlipidemia,       Neuro/Psych  (+) psychiatric history Depression,    GI/Hepatic/Renal/Endo    (+) obesity, morbid obesity, GERD,      Musculoskeletal     (+) back pain,   Abdominal    Substance History - negative use     OB/GYN          Other   arthritis,                      Anesthesia Plan    ASA 3     MAC     (Risks and benefits discussed including risk of aspiration, recall and dental damage. All patient questions answered.    Will continue with plan of care.)  intravenous induction     Anesthetic plan, risks, benefits, and alternatives have been provided, discussed and informed consent has been obtained with: patient.  Pre-procedure education provided  Plan discussed with CRNA.        CODE STATUS:

## 2023-01-16 NOTE — ANESTHESIA POSTPROCEDURE EVALUATION
Patient: Charly Braun    Procedure Summary     Date: 01/16/23 Room / Location: [facility] / [facility]    Anesthesia Start: 1115 Anesthesia Stop: 1158    Procedure: COLONOSCOPY FOR SCREENING CPT CODE: , polypectomies Diagnosis:       Family history of colon cancer      (Family history of colon cancer [Z80.0])    Surgeons: Chapin Murillo MD Provider: Inderjit Barkley CRNA    Anesthesia Type: MAC ASA Status: 3          Anesthesia Type: MAC    Vitals  No vitals data found for the desired time range.          Post Anesthesia Care and Evaluation    Patient location during evaluation: PHASE II  Patient participation: complete - patient participated  Level of consciousness: awake and alert  Pain score: 0  Pain management: satisfactory to patient    Airway patency: patent  Anesthetic complications: No anesthetic complications  PONV Status: none  Cardiovascular status: acceptable and stable  Respiratory status: acceptable, nonlabored ventilation, spontaneous ventilation and unassisted  Hydration status: acceptable    Comments: Vitals signs as noted in nursing documentation as per protocol.

## 2023-01-16 NOTE — DISCHARGE INSTRUCTIONS
- Discharge patient to home (ambulatory).   - High fiber diet.   - Continue present medications.   - Await pathology results.   - Repeat colonoscopy in 3 years for surveillance pending path report   - Return to GI office in 8 weeks.     Rest today  No pushing,pulling,tugging,heavy lifting, or strenuous activity   No major decision making,driving,or drinking alcoholic beverages for 24 hours due to the sedation you received  Always use good hand hygiene/washing technique  No driving on pain medication.     To assist you in voiding:  Drink plenty of fluids  Listen to running water while attempting to void.    If you are unable to urinate and you have an uncomfortable urge to void or it has been   6 hours since you were discharged, return to the Emergency Room.

## 2023-01-16 NOTE — H&P
[facility]  HISTORY AND PHYSICAL    Patient Name: Charly Braun  : 1965  MRN: [id number]    Chief Complaint:   For screening colonoscopy    History Of Presenting Illness:    High risk screening colonoscopy     Past Medical History:   Diagnosis Date   • Acid reflux    • Arthritis    • Back pain    • Degenerative disc disease at L5-S1 level    • Depression    • Essential hypertension 2019   • GERD (gastroesophageal reflux disease)    • Gout    • Hyperlipidemia    • Hypertension    • Irregular heart beat        Past Surgical History:   Procedure Laterality Date   • CARDIAC CATHETERIZATION N/A 2019    Procedure: Left Heart Cath;  Surgeon: Karen Garcia MD;  Location: [facility] CATH INVASIVE LOCATION;  Service: Cardiology   • COLONOSCOPY      Dr. Zain randhawa   • GALLBLADDER SURGERY     • SPINE SURGERY         Social History     Socioeconomic History   • Marital status:    Tobacco Use   • Smoking status: Never   • Smokeless tobacco: Never   Vaping Use   • Vaping Use: Never used   Substance and Sexual Activity   • Alcohol use: No   • Drug use: No   • Sexual activity: Defer       Family History   Problem Relation Age of Onset   • Hypertension Mother    • COPD Father    • Colon polyps Father    • Colon cancer Brother    • Heart disease Brother        Prior to Admission Medications:  Medications Prior to Admission   Medication Sig Dispense Refill Last Dose   • albuterol sulfate HFA (Ventolin HFA) 108 (90 Base) MCG/ACT inhaler Inhale 2 puffs Every 4 (Four) Hours As Needed for Wheezing or Shortness of Air. 18 g 5 Past Week   • allopurinol (ZYLOPRIM) 100 MG tablet Take 100 mg by mouth Daily.   1/15/2023   • anastrozole (Arimidex) 1 MG tablet Take 1 tablet by mouth 1 (One) Time Per Week. Take 1 tablet by mouth on the day of testosterone injection 12 tablet 3 1/15/2023   • atorvastatin (LIPITOR) 20 MG tablet Take 20 mg by mouth Daily.   1/15/2023   • B-D 3CC LUER-ZELALEM SYR  "25GX1\" 25G X 1\" 3 ML misc USE ONCE A WEEK   1/15/2023   • bisoprolol (ZEBeta) 5 MG tablet TAKE 1 AND 1/2 TABLETS BY MOUTH DAILY (Patient taking differently: 5 mg. TAKE 1 AND 1/2 TABLETS BY MOUTH DAILY) 135 tablet 3 1/16/2023 at 0800   • Breo Ellipta 200-25 MCG/ACT inhaler INHALE 1 PUFF BY MOUTH DAILY. RINSE MOUTH WITH WATER AFTER USE (Patient taking differently: Inhale 1 puff Daily.) 60 each 3 Past Week   • Cholecalciferol (Vitamin D3) 50 MCG (2000 UT) capsule Take 1 capsule by mouth Daily.   1/15/2023   • colchicine 0.6 MG tablet TAKE 2 TABLETS BY MOUTH TO START THEN 1 MORE TABLET IN 1 HOUR AS DIRECTED      • cyclobenzaprine (FLEXERIL) 10 MG tablet 10 mg 2 (Two) Times a Day As Needed.   1/15/2023   • FLUoxetine (PROzac) 40 MG capsule Take 40 mg by mouth Daily.   1/15/2023   • fluticasone (FLONASE) 50 MCG/ACT nasal spray 2 sprays into the nostril(s) as directed by provider Daily. 16 g 5 1/15/2023   • HYDROcodone-acetaminophen (NORCO) 7.5-325 MG per tablet Take 1 tablet by mouth Every 6 (Six) Hours As Needed for Moderate Pain .   1/15/2023   • ibuprofen (ADVIL,MOTRIN) 800 MG tablet Take 800 mg by mouth Every 6 (Six) Hours As Needed.  0 1/15/2023   • losartan (COZAAR) 25 MG tablet Take 25 mg by mouth Daily.   1/16/2023   • Needle, Disp, 18G X 1-1/2\" misc Use for drawing up the medication 50 each 3 1/15/2023   • Needle, Disp, 23G X 1-1/2\" misc 1 Device 1 (One) Time Per Week. 50 each 11 1/15/2023   • sodium-potassium-magnesium sulfates (SUPREP) 17.5-3.13-1.6 GM/177ML solution oral solution Take 2 bottles by mouth 1 (One) Time for 1 dose. Please see prep instructions from office 354 mL 0 1/15/2023   • Testosterone Cypionate (DEPOTESTOTERONE CYPIONATE) 200 MG/ML injection ADMINISTER 1 ML IN THE MUSCLE EVERY 14 DAYS AS DIRECTED BY PRESCRIBER 2 mL 0 1/15/2023   • vitamin B-12 (CYANOCOBALAMIN) 1000 MCG tablet TAKE 1 TABLET BY MOUTH 1 TIME   1/15/2023       Allergies:  Allergies   Allergen Reactions   • Tall Ragweed " Irritability   • Lisinopril Cough        Vitals: Temp:  [98 °F (36.7 °C)] 98 °F (36.7 °C)  Heart Rate:  [72] 72  Resp:  [18] 18  BP: (123)/(96) 123/96    Review Of Systems:  Constitutional:  Negative for chills, fever, and unexpected weight change.  Respiratory:  Negative for cough, chest tightness, shortness of breath, and wheezing.  Cardiovascular:  Negative for chest pain, palpitations, and leg swelling.  Gastrointestinal:  Negative for abdominal distention, abdominal pain, Nausea, vomiting.  Neurological:  Negative for Weakness, numbness, and headaches.     Physical Exam:    General Appearance:  Alert, cooperative, in no acute distress.   Lungs:   Clear to auscultation, respirations regular, even and                 unlabored.   Heart:  Regular rhythm and normal rate.   Abdomen:   Normal bowel sounds, no masses, no organomegaly. Soft, non-tender, non-distended   Neurologic: Alert and oriented x 3. Moves all four limbs equally       Plan: COLONOSCOPY FOR SCREENING CPT CODE:  (N/A)     Chapin Murillo MD  1/16/2023

## 2023-01-17 LAB — REF LAB TEST METHOD: NORMAL

## 2023-02-07 DIAGNOSIS — R79.89 LOW TESTOSTERONE IN MALE: ICD-10-CM

## 2023-02-08 ENCOUNTER — TELEPHONE (OUTPATIENT)
Dept: UROLOGY | Facility: CLINIC | Age: 58
End: 2023-02-08
Payer: MEDICARE

## 2023-02-08 NOTE — TELEPHONE ENCOUNTER
Called patient to remind of labs needing done before appointment, can have them drawn in our lab downstairs or in our office after 1pm. Or if he needed the orders faxed to [facility] to let us know.  WILLIAM,CMA

## 2023-02-09 ENCOUNTER — TELEPHONE (OUTPATIENT)
Dept: UROLOGY | Facility: CLINIC | Age: 58
End: 2023-02-09
Payer: MEDICARE

## 2023-02-09 ENCOUNTER — HOSPITAL ENCOUNTER (OUTPATIENT)
Facility: HOSPITAL | Age: 58
Discharge: HOME | End: 2023-02-09
Payer: MEDICARE

## 2023-02-09 LAB
HCT VFR BLD CALC: 48.5 % (ref 40–54)
HEMOGLOBIN: 16 G/DL (ref 13–18)

## 2023-02-09 PROCEDURE — 36415 COLL VENOUS BLD VENIPUNCTURE: CPT

## 2023-02-09 PROCEDURE — 84403 ASSAY OF TOTAL TESTOSTERONE: CPT

## 2023-02-09 PROCEDURE — 84270 ASSAY OF SEX HORMONE GLOBUL: CPT

## 2023-02-09 PROCEDURE — 85018 HEMOGLOBIN: CPT

## 2023-02-09 PROCEDURE — 85014 HEMATOCRIT: CPT

## 2023-02-09 RX ORDER — TESTOSTERONE CYPIONATE 200 MG/ML
INJECTION, SOLUTION INTRAMUSCULAR
Qty: 2 ML | OUTPATIENT
Start: 2023-02-09

## 2023-02-09 NOTE — TELEPHONE ENCOUNTER
I have tried contacting patient on multiple occasions with no answer his last lab values were abnormal and we need to do a dosage change if he wants to continue testosterone therapy.

## 2023-02-09 NOTE — TELEPHONE ENCOUNTER
Let patient know we faxed his lab orders over to [facility], That he will need to register in the front of the hospital before going to the lab & asked him to have them send us results or give him copies since we do not have access to [facility].    CD,CMA

## 2023-02-09 NOTE — TELEPHONE ENCOUNTER
Caller: Charly Braun    Relationship: Self    Best call back number: [phone]    What orders are you requesting (i.e. lab or imaging): LABS    In what timeframe would the patient need to come in: ASAP BEFORE APPT 2/13/23    Where will you receive your lab/imaging services: [facility]    Additional notes: PT WOULD LIKE LAB ORDERS SENT SO HE CAN HAVE LABS DONE TODAY OR TOMORROW. PT WOULD LIKE A CALL BACK TO KNOW WHEN ORDER HAS BEEN SENT.

## 2023-02-11 LAB
SEX HORMONE BINDING GLOBULIN: 16 NMOL/L (ref 11–80)
TESTOSTERONE FREE-NONMALE: 78.3 PG/ML (ref 47–244)
TESTOSTERONE TOTAL: 282 NG/DL (ref 220–1000)

## 2023-02-13 ENCOUNTER — OFFICE VISIT (OUTPATIENT)
Dept: UROLOGY | Facility: CLINIC | Age: 58
End: 2023-02-13
Payer: MEDICARE

## 2023-02-13 VITALS
DIASTOLIC BLOOD PRESSURE: 84 MMHG | TEMPERATURE: 97.8 F | HEIGHT: 72 IN | SYSTOLIC BLOOD PRESSURE: 128 MMHG | RESPIRATION RATE: 18 BRPM | OXYGEN SATURATION: 97 % | WEIGHT: 242 LBS | BODY MASS INDEX: 32.78 KG/M2 | HEART RATE: 75 BPM

## 2023-02-13 DIAGNOSIS — R79.89 LOW TESTOSTERONE IN MALE: Primary | ICD-10-CM

## 2023-02-13 PROCEDURE — 99214 OFFICE O/P EST MOD 30 MIN: CPT | Performed by: NURSE PRACTITIONER

## 2023-02-13 RX ORDER — TESTOSTERONE CYPIONATE 200 MG/ML
100 INJECTION, SOLUTION INTRAMUSCULAR
Qty: 2 ML | Refills: 0 | Status: SHIPPED | OUTPATIENT
Start: 2023-02-13 | End: 2023-03-08

## 2023-02-13 RX ORDER — SYRINGE WITH NEEDLE, 1 ML 25GX5/8"
SYRINGE, EMPTY DISPOSABLE MISCELLANEOUS
COMMUNITY
Start: 2022-11-15

## 2023-02-13 NOTE — PROGRESS NOTES
Office Visit Established Male Patient     Patient Name: Charly Braun  : 1965   MRN: [id number]     Chief Complaint:   Chief Complaint   Patient presents with   • Follow-up     Low testosterone         History of Present Illness: Mr. Charly Braun is a 57 y.o. male who presents today for follow up with hypogonadism. He is feeling less symptomatic on testosterone. Fatigue is better. He reports he has not has a shot in almost 2 weeks due to needing refills.       Subjective      Review of System:   As noted in HPI    Past Medical History:   Past Medical History:   Diagnosis Date   • Acid reflux    • Arthritis    • Back pain    • Degenerative disc disease at L5-S1 level    • Depression    • Essential hypertension 2019   • GERD (gastroesophageal reflux disease)    • Gout    • Hyperlipidemia    • Hypertension    • Irregular heart beat        Past Surgical History:   Past Surgical History:   Procedure Laterality Date   • CARDIAC CATHETERIZATION N/A 2019    Procedure: Left Heart Cath;  Surgeon: Karen Garcia MD;  Location: [facility] CATH INVASIVE LOCATION;  Service: Cardiology   • COLONOSCOPY      Dr. Spring- edis   • COLONOSCOPY N/A 2023    Procedure: COLONOSCOPY FOR SCREENING CPT CODE: , polypectomies;  Surgeon: Chapin Murillo MD;  Location: [facility] ENDOSCOPY;  Service: Gastroenterology;  Laterality: N/A;   • GALLBLADDER SURGERY     • SPINE SURGERY         Family History:   Family History   Problem Relation Age of Onset   • Hypertension Mother    • COPD Father    • Colon polyps Father    • Colon cancer Brother    • Heart disease Brother        Social History:   Social History     Socioeconomic History   • Marital status:    Tobacco Use   • Smoking status: Never   • Smokeless tobacco: Never   Vaping Use   • Vaping Use: Never used   Substance and Sexual Activity   • Alcohol use: No   • Drug use: No   • Sexual activity: Defer       Medications:  "    Current Outpatient Medications:   •  albuterol sulfate HFA (Ventolin HFA) 108 (90 Base) MCG/ACT inhaler, Inhale 2 puffs Every 4 (Four) Hours As Needed for Wheezing or Shortness of Air., Disp: 18 g, Rfl: 5  •  allopurinol (ZYLOPRIM) 100 MG tablet, Take 100 mg by mouth Daily., Disp: , Rfl:   •  anastrozole (Arimidex) 1 MG tablet, Take 1 tablet by mouth 1 (One) Time Per Week. Take 1 tablet by mouth on the day of testosterone injection, Disp: 12 tablet, Rfl: 3  •  atorvastatin (LIPITOR) 20 MG tablet, Take 20 mg by mouth Daily., Disp: , Rfl:   •  B-D 3CC LUER-ZELALEM SYR 25GX1\" 25G X 1\" 3 ML misc, USE ONCE A WEEK, Disp: , Rfl:   •  B-D 3CC LUER-ZELALEM SYR 25GX1/2\" 25G X 1-1/2\" 3 ML misc, USE 1 NEEDLE EVERY WEEK, Disp: , Rfl:   •  bisoprolol (ZEBeta) 5 MG tablet, TAKE 1 AND 1/2 TABLETS BY MOUTH DAILY (Patient taking differently: 5 mg. TAKE 1 AND 1/2 TABLETS BY MOUTH DAILY), Disp: 135 tablet, Rfl: 3  •  Breo Ellipta 200-25 MCG/ACT inhaler, INHALE 1 PUFF BY MOUTH DAILY. RINSE MOUTH WITH WATER AFTER USE (Patient taking differently: Inhale 1 puff Daily.), Disp: 60 each, Rfl: 3  •  Cholecalciferol (Vitamin D3) 50 MCG (2000 UT) capsule, Take 1 capsule by mouth Daily., Disp: , Rfl:   •  cyclobenzaprine (FLEXERIL) 10 MG tablet, 10 mg 2 (Two) Times a Day As Needed., Disp: , Rfl:   •  FLUoxetine (PROzac) 40 MG capsule, Take 40 mg by mouth Daily., Disp: , Rfl:   •  fluticasone (FLONASE) 50 MCG/ACT nasal spray, 2 sprays into the nostril(s) as directed by provider Daily., Disp: 16 g, Rfl: 5  •  HYDROcodone-acetaminophen (NORCO) 7.5-325 MG per tablet, Take 1 tablet by mouth Every 6 (Six) Hours As Needed for Moderate Pain ., Disp: , Rfl:   •  ibuprofen (ADVIL,MOTRIN) 800 MG tablet, Take 800 mg by mouth Every 6 (Six) Hours As Needed., Disp: , Rfl: 0  •  losartan (COZAAR) 25 MG tablet, Take 25 mg by mouth Daily., Disp: , Rfl:   •  Needle, Disp, 18G X 1-1/2\" misc, Use for drawing up the medication, Disp: 50 each, Rfl: 3  •  Needle, Disp, 23G " "X 1-1/2\" misc, 1 Device 1 (One) Time Per Week., Disp: 50 each, Rfl: 11  •  pantoprazole (PROTONIX) 40 MG EC tablet, Take 1 tablet by mouth Daily., Disp: 30 tablet, Rfl: 2  •  Testosterone Cypionate (Depo-Testosterone) 200 MG/ML injection, Inject 0.5 mL into the appropriate muscle as directed by prescriber Every 7 (Seven) Days., Disp: 2 mL, Rfl: 0  •  vitamin B-12 (CYANOCOBALAMIN) 1000 MCG tablet, TAKE 1 TABLET BY MOUTH 1 TIME, Disp: , Rfl:     Allergies:   Allergies   Allergen Reactions   • Tall Ragweed Irritability   • Lisinopril Cough       Objective     Physical Exam:   Vital Signs:   Vitals:    02/13/23 0843   BP: 128/84   BP Location: Right arm   Patient Position: Sitting   Cuff Size: Adult   Pulse: 75   Resp: 18   Temp: 97.8 °F (36.6 °C)   TempSrc: Temporal   SpO2: 97%   Weight: 110 kg (242 lb)   Height: 182.9 cm (72.01\")     Body mass index is 32.81 kg/m².     Physical Exam  Constitutional: NAD, WDWN.   Neurological: A + O x 3.   Psychiatric:  Normal mood and affect    Labs  Brief Urine Lab Results     None          Lab Results   Component Value Date    GLUCOSE 98 06/24/2019    CALCIUM 9.4 06/24/2019     06/24/2019    K 3.8 06/24/2019    CO2 24.0 06/24/2019     06/24/2019    BUN 14 06/24/2019    CREATININE 1.00 06/24/2019    EGFRIFNONA 79 06/24/2019    BCR 14.1 06/24/2019    ANIONGAP 15.0 06/24/2019       Lab Results   Component Value Date    WBC 5.10 06/24/2019    HGB 16.0 02/09/2023    HCT 48.5 02/09/2023    MCV 96.1 06/24/2019     06/24/2019           Assessment / Plan      Assessment/Plan:   Diagnoses and all orders for this visit:    1. Low testosterone in male (Primary)  -     Testosterone Cypionate (Depo-Testosterone) 200 MG/ML injection; Inject 0.5 mL into the appropriate muscle as directed by prescriber Every 7 (Seven) Days.  Dispense: 2 mL; Refill: 0  -     Testosterone; Future  -     Hemoglobin & Hematocrit, Blood; Future    56 yo with hypogonadism is feeling less symptomatic on " testosterone cypionate. We discussed a dose adjustment is needed due to previous labs TT was greater than 1100. Today labs are mildly decreased but this is expected due to timing of lab work. Will decrease dosage from 200mg to 100mg weekly to see if this will keep TT in a therapeutic range.     1. Decrease testosterone cypionate to 100 mg weekly  2. Repeat TT and H&H in 6 months prior to next appointment     Follow Up:   Return in about 6 months (around 8/13/2023) for Follow up MARGOT Parker,NP-C  [facility]

## 2023-03-08 DIAGNOSIS — R79.89 LOW TESTOSTERONE IN MALE: ICD-10-CM

## 2023-03-08 RX ORDER — TESTOSTERONE CYPIONATE 200 MG/ML
INJECTION, SOLUTION INTRAMUSCULAR
Qty: 2 ML | Refills: 0 | Status: SHIPPED | OUTPATIENT
Start: 2023-03-08

## 2023-03-13 NOTE — TELEPHONE ENCOUNTER
I called and spoke with pharmacist and they have corrected this to where the patient only needs the 2 vials. They are ok with him doing 2 doses per vial

## 2023-03-30 RX ORDER — FLUTICASONE FUROATE AND VILANTEROL 200; 25 UG/1; UG/1
POWDER RESPIRATORY (INHALATION)
Qty: 60 EACH | Refills: 3 | OUTPATIENT
Start: 2023-03-30

## 2023-04-18 ENCOUNTER — OFFICE VISIT (OUTPATIENT)
Dept: GASTROENTEROLOGY | Facility: CLINIC | Age: 58
End: 2023-04-18
Payer: MEDICARE

## 2023-04-18 VITALS
DIASTOLIC BLOOD PRESSURE: 80 MMHG | BODY MASS INDEX: 36.07 KG/M2 | OXYGEN SATURATION: 94 % | HEART RATE: 76 BPM | SYSTOLIC BLOOD PRESSURE: 138 MMHG | WEIGHT: 266 LBS

## 2023-04-18 DIAGNOSIS — Z80.0 FAMILY HISTORY OF COLON CANCER: ICD-10-CM

## 2023-04-18 DIAGNOSIS — E66.9 CLASS 2 OBESITY WITH BODY MASS INDEX (BMI) OF 36.0 TO 36.9 IN ADULT, UNSPECIFIED OBESITY TYPE, UNSPECIFIED WHETHER SERIOUS COMORBIDITY PRESENT: ICD-10-CM

## 2023-04-18 DIAGNOSIS — K76.0 NAFLD (NONALCOHOLIC FATTY LIVER DISEASE): ICD-10-CM

## 2023-04-18 DIAGNOSIS — D12.6 TUBULAR ADENOMA OF COLON: ICD-10-CM

## 2023-04-18 DIAGNOSIS — D12.6 SERRATED ADENOMA OF COLON: Primary | ICD-10-CM

## 2023-04-18 NOTE — PROGRESS NOTES
Follow Up Note     Date: 2023   Patient Name: Charly Braun  MRN: [id number]  : 1965     Primary Care Provider: Марина Foster APRN     Chief Complaint   Patient presents with   • Results     Colonoscopy      History of present illness:   2023  Charly Braun is a 57 y.o. male who is here today for follow up regarding Results (Colonoscopy).     Had colonoscopy since last visit, would like to discuss those results. He does have a family history of colon cancer. He has had elevated liver enzymes, has gained weight since last visit. He has not changed his diet. No alcohol use.     Interval History:  10/27/2022  He was told recently that he has elevated liver enzymes. He had an ultrasound a few months ago which showed fatty liver. He has been trying to lose weight over the past couple of months and states that his liver enzymes improved some. He denies any past history of alcoholism, no current alcohol use. He does have history of elevated cholesterol, taking atorvastatin. No prior history of diabetes.      His last colonoscopy was in  by Dr. Spring and normal. His brother had colon cancer when he was in his 20s. He is having BMs daily, no rectal bleeding. No abdominal pain.      He has long history of reflux. Was previously taking protonix 20 mg once daily and a couple of years ago was increased to 40 mg once daily. He has continued it daily since then. Prior to starting this medication he had significant reflux symptoms. No current heartburn now. No dysphagia. No prior EGD.     Subjective     Past Medical History:   Diagnosis Date   • Acid reflux    • Arthritis    • Back pain    • Degenerative disc disease at L5-S1 level    • Depression    • Essential hypertension 2019   • GERD (gastroesophageal reflux disease)    • Gout    • Hyperlipidemia    • Hypertension    • Irregular heart beat      Past Surgical History:   Procedure Laterality Date   • CARDIAC CATHETERIZATION N/A  "06/24/2019    Procedure: Left Heart Cath;  Surgeon: Karen Garcia MD;  Location:  [facility] CATH INVASIVE LOCATION;  Service: Cardiology   • COLONOSCOPY  2014    Dr. Spring- edis   • COLONOSCOPY N/A 1/16/2023    Procedure: COLONOSCOPY FOR SCREENING CPT CODE: , polypectomies;  Surgeon: Chapin Murillo MD;  Location: [facility] ENDOSCOPY;  Service: Gastroenterology;  Laterality: N/A;   • GALLBLADDER SURGERY     • SPINE SURGERY       Family History   Problem Relation Age of Onset   • Hypertension Mother    • COPD Father    • Colon polyps Father    • Colon cancer Brother    • Heart disease Brother      Social History     Socioeconomic History   • Marital status:    Tobacco Use   • Smoking status: Never   • Smokeless tobacco: Never   Vaping Use   • Vaping Use: Never used   Substance and Sexual Activity   • Alcohol use: No   • Drug use: No   • Sexual activity: Defer     Current Outpatient Medications:   •  albuterol sulfate HFA (Ventolin HFA) 108 (90 Base) MCG/ACT inhaler, Inhale 2 puffs Every 4 (Four) Hours As Needed for Wheezing or Shortness of Air., Disp: 18 g, Rfl: 5  •  allopurinol (ZYLOPRIM) 100 MG tablet, Take 1 tablet by mouth Daily., Disp: , Rfl:   •  anastrozole (Arimidex) 1 MG tablet, Take 1 tablet by mouth 1 (One) Time Per Week. Take 1 tablet by mouth on the day of testosterone injection, Disp: 12 tablet, Rfl: 3  •  atorvastatin (LIPITOR) 20 MG tablet, Take 1 tablet by mouth Daily., Disp: , Rfl:   •  B-D 3CC LUER-ZELALEM SYR 25GX1\" 25G X 1\" 3 ML misc, USE ONCE A WEEK, Disp: , Rfl:   •  B-D 3CC LUER-ZELALEM SYR 25GX1/2\" 25G X 1-1/2\" 3 ML misc, USE 1 NEEDLE EVERY WEEK, Disp: , Rfl:   •  bisoprolol (ZEBeta) 5 MG tablet, TAKE 1 AND 1/2 TABLETS BY MOUTH DAILY (Patient taking differently: 1 tablet. TAKE 1 AND 1/2 TABLETS BY MOUTH DAILY), Disp: 135 tablet, Rfl: 3  •  Breo Ellipta 200-25 MCG/ACT inhaler, INHALE 1 PUFF BY MOUTH DAILY. RINSE MOUTH WITH WATER AFTER USE (Patient taking differently: Inhale 1 " "puff Daily.), Disp: 60 each, Rfl: 3  •  Cholecalciferol (Vitamin D3) 50 MCG (2000 UT) capsule, Take 1 capsule by mouth Daily., Disp: , Rfl:   •  cyclobenzaprine (FLEXERIL) 10 MG tablet, 1 tablet 2 (Two) Times a Day As Needed., Disp: , Rfl:   •  FLUoxetine (PROzac) 40 MG capsule, Take 1 capsule by mouth Daily., Disp: , Rfl:   •  fluticasone (FLONASE) 50 MCG/ACT nasal spray, 2 sprays into the nostril(s) as directed by provider Daily., Disp: 16 g, Rfl: 5  •  HYDROcodone-acetaminophen (NORCO) 7.5-325 MG per tablet, Take 1 tablet by mouth Every 6 (Six) Hours As Needed for Moderate Pain., Disp: , Rfl:   •  ibuprofen (ADVIL,MOTRIN) 800 MG tablet, Take 1 tablet by mouth Every 6 (Six) Hours As Needed., Disp: , Rfl: 0  •  losartan (COZAAR) 25 MG tablet, Take 1 tablet by mouth Daily., Disp: , Rfl:   •  Needle, Disp, 18G X 1-1/2\" misc, Use for drawing up the medication, Disp: 50 each, Rfl: 3  •  Needle, Disp, 23G X 1-1/2\" misc, 1 Device 1 (One) Time Per Week., Disp: 50 each, Rfl: 11  •  pantoprazole (PROTONIX) 40 MG EC tablet, Take 1 tablet by mouth Daily., Disp: 30 tablet, Rfl: 2  •  Testosterone Cypionate (DEPOTESTOTERONE CYPIONATE) 200 MG/ML injection, ADMINISTER 0.5 ML IN THE MUSCLE EVERY 7 DAYS AS DIRECTED BY PRESCRIBER, Disp: 2 mL, Rfl: 0  •  vitamin B-12 (CYANOCOBALAMIN) 1000 MCG tablet, TAKE 1 TABLET BY MOUTH 1 TIME, Disp: , Rfl:     Allergies   Allergen Reactions   • Tall Ragweed Irritability   • Lisinopril Cough     The following portions of the patient's history were reviewed and updated as appropriate: allergies, current medications, past family history, past medical history, past social history, past surgical history and problem list.    Objective     Physical Exam  Constitutional:       General: He is not in acute distress.     Appearance: Normal appearance. He is well-developed. He is not diaphoretic.   HENT:      Head: Normocephalic and atraumatic.      Right Ear: External ear normal.      Left Ear: External ear " normal.      Nose: Nose normal.   Eyes:      General: No scleral icterus.        Right eye: No discharge.         Left eye: No discharge.      Conjunctiva/sclera: Conjunctivae normal.   Neck:      Trachea: No tracheal deviation.   Pulmonary:      Effort: Pulmonary effort is normal. No respiratory distress.   Musculoskeletal:         General: Normal range of motion.      Cervical back: Normal range of motion.   Skin:     Coloration: Skin is not pale.      Findings: Erythema (facial) present. No rash.   Neurological:      Mental Status: He is alert and oriented to person, place, and time.      Coordination: Coordination normal.   Psychiatric:         Mood and Affect: Mood normal.         Behavior: Behavior normal.         Thought Content: Thought content normal.         Judgment: Judgment normal.       Vitals:    04/18/23 1402   BP: 138/80   Pulse: 76   SpO2: 94%   Weight: 121 kg (266 lb)     Results Review:   I reviewed the patient's new clinical results.    Colonoscopy by Dr. Murillo 1/16/2023  - One 4 mm polyp in the cecum, removed with a cold snare. Resected and retrieved.  - One 6 mm polyp in the proximal transverse colon, removed with a cold snare. Resected and retrieved.  - One 5 mm polyp in the proximal descending colon, removed with a cold snare. Resected and retrieved.  - One 8 to 10 mm polyp in the proximal ascending colon, removed with a hot snare. Resected and retrieved.  - Two 3 to 4 mm polyps in the mid ascending colon and in the distal ascending colon, removed with a cold snare. Resected and retrieved.  - One 4 mm polyp in the proximal sigmoid colon, removed with a cold snare. Resected and retrieved.  - One 5 mm polyp in the proximal sigmoid colon, removed with a cold snare. Resected and retrieved.  - Three 3 to 4 mm polyps in the mid sigmoid colon and in the distal sigmoid colon, removed with a cold snare. Resected and retrieved.  - Non-bleeding external and internal hemorrhoids.  - The examined  portion of the ileum was normal.  Pathology DIAGNOSIS:   A.   CECUM POLYP:   Tubular adenoma; negative for high-grade dysplasia.   B.   ASCENDING COLON POLYP:   Sessile serrated lesion without cytologic dysplasia.   C.   DESCENDING COLON POLYPS, X4:   Tubular adenoma; negative for high-grade dysplasia.   Multiple portions of hyperplastic polyp(s).   D.   SIGMOID COLON POLYPS, X5:   Multiple portions of tubular adenoma(s); negative for high-grade dysplasia.     Assessment / Plan      1. Serrated adenoma of colon  2. Tubular adenoma of colon  3. Family history of colon cancer  Colonoscopy 1/2023, had 11 colon polyps all small tubular adenomas without dysplasia except one in the ascending colon which was 8-10 mm in size and was serrated adenoma. His brother had colon cancer when he was very young (in his 20s) and has colectomy. His father had colon polyps. Repeat colonoscopy in 1 year, due 1/2024.     Will arrange repeat colonoscopy at next visit    4. NAFLD (nonalcoholic fatty liver disease)  5. Class 2 obesity with body mass index (BMI) of 36.0 to 36.9 in adult, unspecified obesity type, unspecified whether serious comorbidity present  He has known fatty liver disease, found on imaging earlier this year. He has mildly elevated liver enzymes. Cholesterol has been elevated in the past, recently normal on atorvastatin. His BMI is 36, worse from last visit. He is a nonalcoholic. Suspect mildly elevated liver enzymes in hepatocellular pattern due to his fatty liver disease. Expect improvement in liver enzymes as he loses more weight.      Work on weight loss, goal to lose 25 lbs in the next 6 months  Mediterranean diet, information given  Labs prior to next visit with PCP (will review next visit)      Follow Up:   Return in about 6 months (around 10/18/2023) for recheck liver disease.      Jolynn Hayes PA-C  [facility]  4/18/2023  17:00 EDT    Dictated Utilizing Dragon Dictation: Part of this note may be  an electronic transcription/translation of spoken language to printed text using the Dragon Dictation System.

## 2023-04-20 DIAGNOSIS — R79.89 LOW TESTOSTERONE IN MALE: ICD-10-CM

## 2023-04-20 RX ORDER — TESTOSTERONE CYPIONATE 200 MG/ML
100 INJECTION, SOLUTION INTRAMUSCULAR
Qty: 4 ML | Refills: 0 | Status: SHIPPED | OUTPATIENT
Start: 2023-04-20

## 2023-06-10 DIAGNOSIS — R79.89 LOW TESTOSTERONE IN MALE: ICD-10-CM

## 2023-06-12 RX ORDER — TESTOSTERONE CYPIONATE 200 MG/ML
INJECTION, SOLUTION INTRAMUSCULAR
Qty: 4 ML | Refills: 0 | Status: SHIPPED | OUTPATIENT
Start: 2023-06-12

## 2023-08-03 ENCOUNTER — TELEPHONE (OUTPATIENT)
Dept: UROLOGY | Facility: CLINIC | Age: 58
End: 2023-08-03
Payer: MEDICARE

## 2023-08-10 DIAGNOSIS — R79.89 LOW TESTOSTERONE IN MALE: ICD-10-CM

## 2023-08-10 RX ORDER — TESTOSTERONE CYPIONATE 200 MG/ML
INJECTION, SOLUTION INTRAMUSCULAR
Qty: 4 ML | Refills: 0 | Status: SHIPPED | OUTPATIENT
Start: 2023-08-10

## 2023-08-16 DIAGNOSIS — J45.40 MODERATE PERSISTENT ASTHMA WITHOUT COMPLICATION: Primary | ICD-10-CM

## 2023-08-16 RX ORDER — FLUTICASONE FUROATE AND VILANTEROL 200; 25 UG/1; UG/1
1 POWDER RESPIRATORY (INHALATION)
Qty: 60 EACH | Refills: 2 | Status: SHIPPED | OUTPATIENT
Start: 2023-08-16

## 2023-08-28 DIAGNOSIS — E66.01 MORBIDLY OBESE: ICD-10-CM

## 2023-08-28 RX ORDER — BISOPROLOL FUMARATE 5 MG/1
7.5 TABLET, FILM COATED ORAL DAILY
Qty: 135 TABLET | Refills: 3 | Status: SHIPPED | OUTPATIENT
Start: 2023-08-28

## 2023-09-06 ENCOUNTER — TELEPHONE (OUTPATIENT)
Dept: UROLOGY | Facility: CLINIC | Age: 58
End: 2023-09-06
Payer: MEDICARE

## 2023-09-08 ENCOUNTER — TELEPHONE (OUTPATIENT)
Dept: UROLOGY | Facility: CLINIC | Age: 58
End: 2023-09-08
Payer: MEDICARE

## 2023-09-08 NOTE — TELEPHONE ENCOUNTER
Left pt a detailed VM about rescheduling his appointment on 9-11 due to not having his labs completed. Melo will do a video visit or whichever once he gets those complete.    CD,CMA

## 2023-09-11 ENCOUNTER — HOSPITAL ENCOUNTER (OUTPATIENT)
Facility: HOSPITAL | Age: 58
Discharge: HOME | End: 2023-09-11
Payer: MEDICARE

## 2023-09-11 LAB
HCT VFR BLD AUTO: 49.6 % (ref 40–54)
HGB BLD-MCNC: 16.5 G/DL (ref 13–18)

## 2023-09-11 PROCEDURE — 84403 ASSAY OF TOTAL TESTOSTERONE: CPT

## 2023-09-11 PROCEDURE — 85018 HEMOGLOBIN: CPT

## 2023-09-11 PROCEDURE — 36415 COLL VENOUS BLD VENIPUNCTURE: CPT

## 2023-09-11 PROCEDURE — 85014 HEMATOCRIT: CPT

## 2023-09-11 NOTE — TELEPHONE ENCOUNTER
Caller: Charly Braun     Relationship: [unfilled] SELF    Best call back number: [phone]    What is your medical concern? PT RETURNED YOUR CALL. PLEASE CALL HIM AT YOUR EARLIEST CONVENIENCE. THANK YOU

## 2023-09-14 LAB — TESTOST SERPL-MCNC: 379 NG/DL (ref 220–1000)

## 2023-09-20 NOTE — PROGRESS NOTES
"[facility] Cardiology    Patient ID: Charly Braun is a 58 y.o. male.  : 1965   Contact: [phone]    Encounter date: 2023    PCP: Марина Foster APRN      Chief complaint:   Chief Complaint   Patient presents with    Palpitations       Problem List:  Palpitations:  48h Holter, 06/10/2019: Rare PAC's, occasional PVC's, one 6-beat run of VT.  Normal LHC, 2019.  Echo, 2019: EF 60%. Trace-to-mild MR/TR.  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 Take 1 tablet by mouth on the day of testosterone injection, Disp: 12 tablet, Rfl: 3    atorvastatin (LIPITOR) 20 MG tablet, Take 1 tablet by mouth Daily., Disp: , Rfl:     B-D 3CC LUER-ZELALEM SYR 25GX1\" 25G X 1\" 3 ML misc, USE ONCE A WEEK, Disp: , Rfl:     B-D 3CC LUER-ZELALEM SYR 25GX1/2\" 25G X 1-1/2\" 3 ML misc, USE 1 NEEDLE EVERY WEEK, Disp: , Rfl:     bisoprolol (ZEBeta) 5 MG tablet, Take 1.5 tablets by mouth Daily., Disp: 135 tablet, Rfl: 3    Cholecalciferol (Vitamin D3) 50 MCG (2000 UT) capsule, Take 1 capsule by mouth Daily., Disp: , Rfl:     cyclobenzaprine (FLEXERIL) 10 MG tablet, 1 tablet 2 (Two) Times a Day As Needed., Disp: , Rfl:     FLUoxetine (PROzac) 40 MG capsule, Take 1 capsule by mouth Daily., Disp: , Rfl:     fluticasone (FLONASE) 50 MCG/ACT nasal spray, 2 sprays into the nostril(s) as directed by provider Daily., Disp: 16 g, Rfl: 5    Fluticasone Furoate-Vilanterol (Breo Ellipta) " "200-25 MCG/ACT inhaler, Inhale 1 puff Daily., Disp: 60 each, Rfl: 2    HYDROcodone-acetaminophen (NORCO) 7.5-325 MG per tablet, Take 1 tablet by mouth Every 6 (Six) Hours As Needed for Moderate Pain., Disp: , Rfl:     ibuprofen (ADVIL,MOTRIN) 800 MG tablet, Take 1 tablet by mouth Every 6 (Six) Hours As Needed., Disp: , Rfl: 0    losartan (COZAAR) 25 MG tablet, Take 1 tablet by mouth Daily., Disp: , Rfl:     Needle, Disp, 18G X 1-1/2\" misc, Use for drawing up the medication, Disp: 50 each, Rfl: 3    Needle, Disp, 23G X 1-1/2\" misc, 1 Device 1 (One) Time Per Week., Disp: 50 each, Rfl: 11    pantoprazole (PROTONIX) 40 MG EC tablet, Take 1 tablet by mouth Daily., Disp: 30 tablet, Rfl: 2    Testosterone Cypionate (DEPOTESTOTERONE CYPIONATE) 200 MG/ML injection, INJECT 0.5ML IN THE MUSCLE EVERY 7 DAYS. WASTE 0.5ML WITH EACH VIAL AND DISPOSE., Disp: 4 mL, Rfl: 0    vitamin B-12 (CYANOCOBALAMIN) 1000 MCG tablet, TAKE 1 TABLET BY MOUTH 1 TIME, Disp: , Rfl:     HPI    Charly Braun is a 58 y.o. male who presents today for a one year follow up of palpitations, nonsustained ventricular tachycardia, premature ventricular contractions, and cardiac risk factors. Since last visit, patient has been doing well overall from a cardiovascular standpoint. He states he has recently noticed a lack of palpitations. Patient does not have a regular exercise routine due to back pain but walks often. He reports he has recently been diagnosed with asthma and is on an inhaler daily. Patient denies chest pain, shortness of breath, orthopnea, palpitations, edema, dizziness, and syncope.            The following portions of the patient's history were reviewed and updated as appropriate: allergies, current medications and problem list.    Pertinent positives as listed in the HPI.  All other systems reviewed are negative.         Vitals:    09/21/23 0916   BP: 122/68   BP Location: Left arm   Patient Position: Sitting   Pulse: 76   SpO2: 95% " "  Weight: 121 kg (267 lb)   Height: 182.9 cm (72\")       Physical Exam:  General: Alert and oriented.  Neck: Jugular venous pressure is within normal limits. Carotids have normal upstrokes without bruits.   Cardiovascular: Heart has a nondisplaced focal PMI. Regular rate and rhythm. No murmur, gallop or rub.  Lungs: Clear, no rales or wheezes. Equal expansion is noted.   Extremities: Show no edema.  Skin: Warm and dry.  Neurologic: Nonfocal.     Diagnostic Data (reviewed with patient):  Lab date: 10/13/2022  FLP: , , HDL 34, LDL 79  CMP: Glu 86, BUN 18, Creat 1.14, eGFR 76, Na 138, K 4.5, Cl 101, CO2 28, Ca 9.8, Alk Phos 57, AST 41, ALT 51  CBC: WBC 9.8, RBC 5.45, HGB 17.3, HCT 52, MCV 95.4, MCH 31.7,              ECG 12 Lead    Date/Time: 9/21/2023 9:26 AM  Performed by: Karen Garcia MD  Authorized by: Karen Garcia MD   Comparison: compared with previous ECG from 1/17/2019  Similar to previous ECG  Rhythm: sinus rhythm  BPM: 73  Other findings: T wave abnormality    Clinical impression: abnormal EKG          Assessment:    ICD-10-CM ICD-9-CM   1. Palpitations  R00.2 785.1   2. NSVT (nonsustained ventricular tachycardia)  I47.29 427.1   3. PVC (premature ventricular contraction)  I49.3 427.69   4. Essential hypertension  I10 401.9         Plan:  Begin routine aerobic exercise for at least 30 minutes 5 days per week. Elliptical and stationary bike discussed.  Continue on bisoprolol 7.5 mg daily for rate control and hypertension.   Continue on losartan 25 mg daily for hypertension.   Continue on atorvastatin 20 mg daily for hyperlipidemia.   Continue all other current medications.  F/up in 12 months, sooner if needed.      Scribed for Karen Garcia MD by Josep Mead. 9/21/2023 09:27 EDT    I Karen Garcia MD personally performed the services described in this documentation as scribed by the above individual in my presence, and it is both accurate and " complete.    Karen Garcia MD, FACC

## 2023-09-21 ENCOUNTER — HOSPITAL ENCOUNTER (OUTPATIENT)
Facility: HOSPITAL | Age: 58
Discharge: HOME | End: 2023-09-21
Payer: MEDICARE

## 2023-09-21 ENCOUNTER — OFFICE VISIT (OUTPATIENT)
Dept: CARDIOLOGY | Facility: CLINIC | Age: 58
End: 2023-09-21
Payer: MEDICARE

## 2023-09-21 VITALS
OXYGEN SATURATION: 95 % | WEIGHT: 267 LBS | SYSTOLIC BLOOD PRESSURE: 122 MMHG | HEART RATE: 76 BPM | BODY MASS INDEX: 36.16 KG/M2 | HEIGHT: 72 IN | DIASTOLIC BLOOD PRESSURE: 68 MMHG

## 2023-09-21 DIAGNOSIS — I47.29 NSVT (NONSUSTAINED VENTRICULAR TACHYCARDIA): ICD-10-CM

## 2023-09-21 DIAGNOSIS — I49.3 PVC (PREMATURE VENTRICULAR CONTRACTION): ICD-10-CM

## 2023-09-21 DIAGNOSIS — I10 ESSENTIAL HYPERTENSION: ICD-10-CM

## 2023-09-21 DIAGNOSIS — R00.2 PALPITATIONS: Primary | ICD-10-CM

## 2023-09-21 PROCEDURE — 93005 ELECTROCARDIOGRAM TRACING: CPT

## 2023-09-21 PROCEDURE — 99213 OFFICE O/P EST LOW 20 MIN: CPT | Performed by: INTERNAL MEDICINE

## 2023-09-21 PROCEDURE — 3078F DIAST BP <80 MM HG: CPT | Performed by: INTERNAL MEDICINE

## 2023-09-21 PROCEDURE — 93000 ELECTROCARDIOGRAM COMPLETE: CPT | Performed by: INTERNAL MEDICINE

## 2023-09-21 PROCEDURE — 1159F MED LIST DOCD IN RCRD: CPT | Performed by: INTERNAL MEDICINE

## 2023-09-21 PROCEDURE — 3074F SYST BP LT 130 MM HG: CPT | Performed by: INTERNAL MEDICINE

## 2023-09-21 PROCEDURE — 1160F RVW MEDS BY RX/DR IN RCRD: CPT | Performed by: INTERNAL MEDICINE

## 2023-10-18 ENCOUNTER — OFFICE VISIT (OUTPATIENT)
Dept: GASTROENTEROLOGY | Facility: CLINIC | Age: 58
End: 2023-10-18
Payer: MEDICARE

## 2023-10-18 VITALS
DIASTOLIC BLOOD PRESSURE: 84 MMHG | BODY MASS INDEX: 35.61 KG/M2 | WEIGHT: 262.6 LBS | HEART RATE: 78 BPM | SYSTOLIC BLOOD PRESSURE: 132 MMHG | OXYGEN SATURATION: 97 %

## 2023-10-18 DIAGNOSIS — R79.89 LOW TESTOSTERONE IN MALE: ICD-10-CM

## 2023-10-18 DIAGNOSIS — Z80.0 FAMILY HISTORY OF COLON CANCER: Chronic | ICD-10-CM

## 2023-10-18 DIAGNOSIS — D12.6 TUBULAR ADENOMA OF COLON: ICD-10-CM

## 2023-10-18 DIAGNOSIS — K76.0 NAFLD (NONALCOHOLIC FATTY LIVER DISEASE): Chronic | ICD-10-CM

## 2023-10-18 DIAGNOSIS — D12.6 SERRATED ADENOMA OF COLON: Primary | ICD-10-CM

## 2023-10-18 PROCEDURE — 3079F DIAST BP 80-89 MM HG: CPT | Performed by: PHYSICIAN ASSISTANT

## 2023-10-18 PROCEDURE — 1160F RVW MEDS BY RX/DR IN RCRD: CPT | Performed by: PHYSICIAN ASSISTANT

## 2023-10-18 PROCEDURE — 1159F MED LIST DOCD IN RCRD: CPT | Performed by: PHYSICIAN ASSISTANT

## 2023-10-18 PROCEDURE — 3075F SYST BP GE 130 - 139MM HG: CPT | Performed by: PHYSICIAN ASSISTANT

## 2023-10-18 PROCEDURE — 99214 OFFICE O/P EST MOD 30 MIN: CPT | Performed by: PHYSICIAN ASSISTANT

## 2023-10-18 RX ORDER — TESTOSTERONE CYPIONATE 200 MG/ML
INJECTION, SOLUTION INTRAMUSCULAR
Qty: 4 ML | OUTPATIENT
Start: 2023-10-18

## 2023-10-18 RX ORDER — SODIUM CHLORIDE 9 MG/ML
30 INJECTION, SOLUTION INTRAVENOUS CONTINUOUS PRN
OUTPATIENT
Start: 2023-10-18

## 2023-10-18 NOTE — PROGRESS NOTES
Follow Up Note     Date: 10/18/2023   Patient Name: Charly Braun  MRN: [id number]  : 1965     Primary Care Provider: Марина Foster APRN     Chief Complaint   Patient presents with    Hepatic Disease     History of present illness:   10/18/2023  Charly Braun is a 58 y.o. male who is here today for follow up regarding Hepatic Disease.    Had labs with PCP a couple of months ago, unsure of those results. Has been trying to modify his diet. Has lost 4 lbs since last visit. He is due now for repeat colonoscopy. No current constipation, diarrhea or rectal bleeding.     Interval History:  10/27/2022  He was told recently that he has elevated liver enzymes. He had an ultrasound a few months ago which showed fatty liver. He has been trying to lose weight over the past couple of months and states that his liver enzymes improved some. He denies any past history of alcoholism, no current alcohol use. He does have history of elevated cholesterol, taking atorvastatin. No prior history of diabetes.      His last colonoscopy was in  by Dr. Spring and normal. His brother had colon cancer when he was in his 20s. He is having BMs daily, no rectal bleeding. No abdominal pain.      He has long history of reflux. Was previously taking protonix 20 mg once daily and a couple of years ago was increased to 40 mg once daily. He has continued it daily since then. Prior to starting this medication he had significant reflux symptoms. No current heartburn now. No dysphagia. No prior EGD.     Subjective     Past Medical History:   Diagnosis Date    Acid reflux     Arthritis     Back pain     Degenerative disc disease at L5-S1 level     Depression     Essential hypertension 2019    GERD (gastroesophageal reflux disease)     Gout     Hyperlipidemia     Hypertension     Irregular heart beat      Past Surgical History:   Procedure Laterality Date    CARDIAC CATHETERIZATION N/A 2019    Procedure: Left Heart  "Cath;  Surgeon: Karen Garcia MD;  Location:  [facility];  Service: Cardiology    COLONOSCOPY  2014    Dr. Spring- normal    COLONOSCOPY N/A 1/16/2023    Procedure: COLONOSCOPY FOR SCREENING CPT CODE: , polypectomies;  Surgeon: Chapin Murillo MD;  Location: [facility];  Service: Gastroenterology;  Laterality: N/A;    GALLBLADDER SURGERY      SPINE SURGERY       Family History   Problem Relation Age of Onset    Hypertension Mother     COPD Father     Colon polyps Father     Colon cancer Brother     Heart disease Brother      Social History     Socioeconomic History    Marital status:    Tobacco Use    Smoking status: Never    Smokeless tobacco: Never   Vaping Use    Vaping Use: Never used   Substance and Sexual Activity    Alcohol use: No    Drug use: No    Sexual activity: Defer     Current Outpatient Medications:     albuterol sulfate HFA (Ventolin HFA) 108 (90 Base) MCG/ACT inhaler, Inhale 2 puffs Every 4 (Four) Hours As Needed for Wheezing or Shortness of Air., Disp: 18 g, Rfl: 5    allopurinol (ZYLOPRIM) 100 MG tablet, Take 1 tablet by mouth Daily., Disp: , Rfl:     anastrozole (Arimidex) 1 MG tablet, Take 1 tablet by mouth 1 (One) Time Per Week. Take 1 tablet by mouth on the day of testosterone injection, Disp: 12 tablet, Rfl: 3    atorvastatin (LIPITOR) 20 MG tablet, Take 1 tablet by mouth Daily., Disp: , Rfl:     B-D 3CC LUER-ZELALEM SYR 25GX1\" 25G X 1\" 3 ML misc, USE ONCE A WEEK, Disp: , Rfl:     B-D 3CC LUER-ZELALEM SYR 25GX1/2\" 25G X 1-1/2\" 3 ML misc, USE 1 NEEDLE EVERY WEEK, Disp: , Rfl:     bisoprolol (ZEBeta) 5 MG tablet, Take 1.5 tablets by mouth Daily., Disp: 135 tablet, Rfl: 3    Cholecalciferol (Vitamin D3) 50 MCG (2000 UT) capsule, Take 1 capsule by mouth Daily., Disp: , Rfl:     cyclobenzaprine (FLEXERIL) 10 MG tablet, 1 tablet 2 (Two) Times a Day As Needed., Disp: , Rfl:     FLUoxetine (PROzac) 40 MG capsule, Take 1 capsule by mouth Daily., Disp: , " "Rfl:     fluticasone (FLONASE) 50 MCG/ACT nasal spray, 2 sprays into the nostril(s) as directed by provider Daily., Disp: 16 g, Rfl: 5    Fluticasone Furoate-Vilanterol (Breo Ellipta) 200-25 MCG/ACT inhaler, Inhale 1 puff Daily., Disp: 60 each, Rfl: 2    HYDROcodone-acetaminophen (NORCO) 7.5-325 MG per tablet, Take 1 tablet by mouth Every 6 (Six) Hours As Needed for Moderate Pain., Disp: , Rfl:     ibuprofen (ADVIL,MOTRIN) 800 MG tablet, Take 1 tablet by mouth Every 6 (Six) Hours As Needed., Disp: , Rfl: 0    losartan (COZAAR) 25 MG tablet, Take 1 tablet by mouth Daily., Disp: , Rfl:     Needle, Disp, 18G X 1-1/2\" misc, Use for drawing up the medication, Disp: 50 each, Rfl: 3    Needle, Disp, 23G X 1-1/2\" misc, 1 Device 1 (One) Time Per Week., Disp: 50 each, Rfl: 11    pantoprazole (PROTONIX) 40 MG EC tablet, Take 1 tablet by mouth Daily., Disp: 30 tablet, Rfl: 2    Testosterone Cypionate (DEPOTESTOTERONE CYPIONATE) 200 MG/ML injection, INJECT 0.5ML IN THE MUSCLE EVERY 7 DAYS. WASTE 0.5ML WITH EACH VIAL AND DISPOSE., Disp: 4 mL, Rfl: 0    vitamin B-12 (CYANOCOBALAMIN) 1000 MCG tablet, TAKE 1 TABLET BY MOUTH 1 TIME, Disp: , Rfl:     Sod Picosulfate-Mag Ox-Cit Acd 10-3.5-12 MG-GM-GM pack, Take 350 mL by mouth Take As Directed. Please follow instructions given at office, Disp: 2 each, Rfl: 0    Allergies   Allergen Reactions    Tall Ragweed Irritability    Lisinopril Cough     The following portions of the patient's history were reviewed and updated as appropriate: allergies, current medications, past family history, past medical history, past social history, past surgical history and problem list.    Objective     Physical Exam  Constitutional:       General: He is not in acute distress.     Appearance: Normal appearance. He is well-developed. He is obese. He is not diaphoretic.   HENT:      Head: Normocephalic and atraumatic.      Right Ear: External ear normal.      Left Ear: External ear normal.      Nose: Nose " normal.   Eyes:      General: No scleral icterus.        Right eye: No discharge.         Left eye: No discharge.      Conjunctiva/sclera: Conjunctivae normal.   Neck:      Trachea: No tracheal deviation.   Pulmonary:      Effort: Pulmonary effort is normal. No respiratory distress.   Musculoskeletal:         General: Normal range of motion.      Cervical back: Normal range of motion.   Skin:     Coloration: Skin is not pale.      Findings: No erythema or rash.   Neurological:      Mental Status: He is alert and oriented to person, place, and time.      Coordination: Coordination normal.   Psychiatric:         Mood and Affect: Mood normal.         Behavior: Behavior normal.         Thought Content: Thought content normal.         Judgment: Judgment normal.       Vitals:    10/18/23 0915   BP: 132/84   Pulse: 78   SpO2: 97%   Weight: 119 kg (262 lb 9.6 oz)     Results Review:   I reviewed the patient's new clinical results.    Colonoscopy by Dr. Murillo 1/16/2023  - One 4 mm polyp in the cecum, removed with a cold snare. Resected and retrieved.  - One 6 mm polyp in the proximal transverse colon, removed with a cold snare. Resected and retrieved.  - One 5 mm polyp in the proximal descending colon, removed with a cold snare. Resected and retrieved.  - One 8 to 10 mm polyp in the proximal ascending colon, removed with a hot snare. Resected and retrieved.  - Two 3 to 4 mm polyps in the mid ascending colon and in the distal ascending colon, removed with a cold snare. Resected and retrieved.  - One 4 mm polyp in the proximal sigmoid colon, removed with a cold snare. Resected and retrieved.  - One 5 mm polyp in the proximal sigmoid colon, removed with a cold snare. Resected and retrieved.  - Three 3 to 4 mm polyps in the mid sigmoid colon and in the distal sigmoid colon, removed with a cold snare. Resected and retrieved.  - Non-bleeding external and internal hemorrhoids.  - The examined portion of the ileum was  normal.  Pathology DIAGNOSIS:   A.   CECUM POLYP:   Tubular adenoma; negative for high-grade dysplasia.   B.   ASCENDING COLON POLYP:   Sessile serrated lesion without cytologic dysplasia.   C.   DESCENDING COLON POLYPS, X4:   Tubular adenoma; negative for high-grade dysplasia.   Multiple portions of hyperplastic polyp(s).   D.   SIGMOID COLON POLYPS, X5:   Multiple portions of tubular adenoma(s); negative for high-grade dysplasia.     US gb 7/2022   1.  Hepatic steatosis.   2.  Status post cholecystectomy     Assessment / Plan      1. Serrated adenoma of colon  2. Tubular adenoma of colon  3. Family history of colon cancer  Colonoscopy 1/2023, had 11 colon polyps all small tubular adenomas without dysplasia except one in the ascending colon which was 8-10 mm in size and was serrated adenoma. His brother had colon cancer when he was very young (in his 20s) and has colectomy. His father had colon polyps. Repeat colonoscopy in 1 year, due 1/2024, will arrange.     He will have a colonoscopy performed with monitored anesthesia care. The indications, technique, alternatives and potential risk and complications were discussed with the patient including but not limited to bleeding, bowel perforations, missing lesions and anesthetic complications. The patient understands and wishes to proceed with the procedure and has given their verbal consent. Written patient education information was given to the patient. He should follow up in the office after this procedure to discuss the results and further recommendations can be made at that time. The patient will call if they have further questions before procedure.  - Case Request  - Sod Picosulfate-Mag Ox-Cit Acd 10-3.5-12 MG-GM-GM pack; Take 350 mL by mouth Take As Directed. Please follow instructions given at office  Dispense: 2 each; Refill: 0    4. NAFLD (nonalcoholic fatty liver disease)  He has known fatty liver disease, found on imaging 2022. He has mildly elevated liver  enzymes on past labs, no recent labs available. Cholesterol has been elevated in the past, recently normal on atorvastatin. His BMI is 35. He is a nonalcoholic. Suspect mildly elevated liver enzymes in hepatocellular pattern due to his fatty liver disease. Expect improvement in liver enzymes as he loses weight.      Work on weight loss, goal to lose 25 lbs in the next 6 months  Mediterranean diet, information given  Will request PCP labs for review    Follow Up:   Return for follow up after procedure to discuss results.    Jolynn Hayes PA-C  [facility]  10/18/2023  16:51 EDT    Dictated Utilizing Dragon Dictation: Part of this note may be an electronic transcription/translation of spoken language to printed text using the Dragon Dictation System.

## 2023-10-19 PROBLEM — D12.6 TUBULAR ADENOMA OF COLON: Status: ACTIVE | Noted: 2023-10-18

## 2023-10-19 PROBLEM — D12.6 SERRATED ADENOMA OF COLON: Status: ACTIVE | Noted: 2023-10-18

## 2023-10-23 ENCOUNTER — TELEPHONE (OUTPATIENT)
Dept: UROLOGY | Facility: CLINIC | Age: 58
End: 2023-10-23

## 2023-10-23 NOTE — TELEPHONE ENCOUNTER
Hub staff attempted to follow warm transfer process and was unsuccessful     Caller: KACEY ALICIA    Relationship to patient: SELF    Best call back number: [phone]    Patient is needing: PAT IS WONDERING IF HE SHOULD GET REFILL OF TESTOSTERONE FROM [facility] BEFORE HIS APPT OR WAIT TILL HE SEES HER ON 11/13/23. PLEASE CALL PAT TO DISCUSS. THANK YOU.

## 2023-10-24 ENCOUNTER — TELEPHONE (OUTPATIENT)
Dept: UROLOGY | Facility: CLINIC | Age: 58
End: 2023-10-24
Payer: MEDICARE

## 2023-10-24 NOTE — TELEPHONE ENCOUNTER
Left pt voicemail in regards to message put in about refills I explained he had to be seen every 6 months for refills so he will need a appointment with labs. I offered him earlier appointment if needed [facility]

## 2023-11-13 ENCOUNTER — HOSPITAL ENCOUNTER (OUTPATIENT)
Facility: HOSPITAL | Age: 58
Discharge: HOME | End: 2023-11-13
Payer: MEDICARE

## 2023-11-13 ENCOUNTER — OFFICE VISIT (OUTPATIENT)
Dept: UROLOGY | Facility: CLINIC | Age: 58
End: 2023-11-13
Payer: MEDICARE

## 2023-11-13 VITALS
BODY MASS INDEX: 35.57 KG/M2 | SYSTOLIC BLOOD PRESSURE: 136 MMHG | WEIGHT: 262.59 LBS | HEIGHT: 72 IN | DIASTOLIC BLOOD PRESSURE: 90 MMHG

## 2023-11-13 DIAGNOSIS — R79.89 LOW TESTOSTERONE IN MALE: ICD-10-CM

## 2023-11-13 DIAGNOSIS — N40.0 BENIGN PROSTATIC HYPERPLASIA, UNSPECIFIED WHETHER LOWER URINARY TRACT SYMPTOMS PRESENT: Primary | ICD-10-CM

## 2023-11-13 LAB — PSA SERPL DL<=0.01 NG/ML-MCNC: 1.24 NG/ML (ref 0–4)

## 2023-11-13 PROCEDURE — 99214 OFFICE O/P EST MOD 30 MIN: CPT | Performed by: NURSE PRACTITIONER

## 2023-11-13 PROCEDURE — 84153 ASSAY OF PSA TOTAL: CPT

## 2023-11-13 PROCEDURE — 3080F DIAST BP >= 90 MM HG: CPT | Performed by: NURSE PRACTITIONER

## 2023-11-13 PROCEDURE — 36415 COLL VENOUS BLD VENIPUNCTURE: CPT

## 2023-11-13 PROCEDURE — 3075F SYST BP GE 130 - 139MM HG: CPT | Performed by: NURSE PRACTITIONER

## 2023-11-13 RX ORDER — TESTOSTERONE CYPIONATE 200 MG/ML
100 INJECTION, SOLUTION INTRAMUSCULAR
Qty: 4 ML | Refills: 0 | Status: SHIPPED | OUTPATIENT
Start: 2023-11-13

## 2023-11-13 NOTE — PROGRESS NOTES
Office Visit Established Male Patient     Patient Name: Charly Braun  : 1965   MRN: [id number]     Chief Complaint:   Chief Complaint   Patient presents with    Follow-up     Low Testosterone         History of Present Illness: Mr. Charly Braun is a 58 y.o. male who presents today for follow up with hypogonadism.  He is doing well has not had his testosterone in a few weeks and can feel a difference without it.      Subjective      Review of System:   As noted in HPI    Past Medical History:   Past Medical History:   Diagnosis Date    Acid reflux     Arthritis     Back pain     Degenerative disc disease at L5-S1 level     Depression     Essential hypertension 2019    GERD (gastroesophageal reflux disease)     Gout     Hyperlipidemia     Hypertension     Irregular heart beat        Past Surgical History:   Past Surgical History:   Procedure Laterality Date    CARDIAC CATHETERIZATION N/A 2019    Procedure: Left Heart Cath;  Surgeon: Karen Garcia MD;  Location: [facility] CATH INVASIVE LOCATION;  Service: Cardiology    COLONOSCOPY      Dr. Spring- normal    COLONOSCOPY N/A 2023    Procedure: COLONOSCOPY FOR SCREENING CPT CODE: , polypectomies;  Surgeon: Chapin Murillo MD;  Location: [facility] ENDOSCOPY;  Service: Gastroenterology;  Laterality: N/A;    GALLBLADDER SURGERY      SPINE SURGERY         Family History:   Family History   Problem Relation Age of Onset    Hypertension Mother     COPD Father     Colon polyps Father     Colon cancer Brother     Heart disease Brother        Social History:   Social History     Socioeconomic History    Marital status:    Tobacco Use    Smoking status: Never    Smokeless tobacco: Never   Vaping Use    Vaping Use: Never used   Substance and Sexual Activity    Alcohol use: No    Drug use: No    Sexual activity: Defer       Medications:     Current Outpatient Medications:     albuterol sulfate HFA (Ventolin HFA) 108  "(90 Base) MCG/ACT inhaler, Inhale 2 puffs Every 4 (Four) Hours As Needed for Wheezing or Shortness of Air., Disp: 18 g, Rfl: 5    allopurinol (ZYLOPRIM) 100 MG tablet, Take 1 tablet by mouth Daily., Disp: , Rfl:     anastrozole (Arimidex) 1 MG tablet, Take 1 tablet by mouth 1 (One) Time Per Week. Take 1 tablet by mouth on the day of testosterone injection, Disp: 12 tablet, Rfl: 3    atorvastatin (LIPITOR) 20 MG tablet, Take 1 tablet by mouth Daily., Disp: , Rfl:     B-D 3CC LUER-ZELALEM SYR 25GX1\" 25G X 1\" 3 ML misc, USE ONCE A WEEK, Disp: , Rfl:     B-D 3CC LUER-ZELALEM SYR 25GX1/2\" 25G X 1-1/2\" 3 ML misc, USE 1 NEEDLE EVERY WEEK, Disp: , Rfl:     bisoprolol (ZEBeta) 5 MG tablet, Take 1.5 tablets by mouth Daily., Disp: 135 tablet, Rfl: 3    Cholecalciferol (Vitamin D3) 50 MCG (2000 UT) capsule, Take 1 capsule by mouth Daily., Disp: , Rfl:     cyclobenzaprine (FLEXERIL) 10 MG tablet, 1 tablet 2 (Two) Times a Day As Needed., Disp: , Rfl:     FLUoxetine (PROzac) 40 MG capsule, Take 1 capsule by mouth Daily., Disp: , Rfl:     fluticasone (FLONASE) 50 MCG/ACT nasal spray, 2 sprays into the nostril(s) as directed by provider Daily., Disp: 16 g, Rfl: 5    Fluticasone Furoate-Vilanterol (Breo Ellipta) 200-25 MCG/ACT inhaler, Inhale 1 puff Daily., Disp: 60 each, Rfl: 2    HYDROcodone-acetaminophen (NORCO) 7.5-325 MG per tablet, Take 1 tablet by mouth Every 6 (Six) Hours As Needed for Moderate Pain., Disp: , Rfl:     ibuprofen (ADVIL,MOTRIN) 800 MG tablet, Take 1 tablet by mouth Every 6 (Six) Hours As Needed., Disp: , Rfl: 0    losartan (COZAAR) 25 MG tablet, Take 1 tablet by mouth Daily., Disp: , Rfl:     Needle, Disp, 18G X 1-1/2\" misc, Use for drawing up the medication, Disp: 50 each, Rfl: 3    Needle, Disp, 23G X 1-1/2\" misc, 1 Device 1 (One) Time Per Week., Disp: 50 each, Rfl: 11    pantoprazole (PROTONIX) 40 MG EC tablet, Take 1 tablet by mouth Daily., Disp: 30 tablet, Rfl: 2    Sod Picosulfate-Mag Ox-Cit Acd 10-3.5-12 " "MG-GM-GM pack, Take 350 mL by mouth Take As Directed. Please follow instructions given at office, Disp: 2 each, Rfl: 0    Testosterone Cypionate (DEPOTESTOTERONE CYPIONATE) 200 MG/ML injection, INJECT 0.5ML IN THE MUSCLE EVERY 7 DAYS. WASTE 0.5ML WITH EACH VIAL AND DISPOSE., Disp: 4 mL, Rfl: 0    vitamin B-12 (CYANOCOBALAMIN) 1000 MCG tablet, TAKE 1 TABLET BY MOUTH 1 TIME, Disp: , Rfl:     Allergies:   Allergies   Allergen Reactions    Tall Ragweed Irritability    Lisinopril Cough       Objective     Physical Exam:   Vital Signs:   Vitals:    11/13/23 0833   BP: 136/90   BP Location: Left arm   Patient Position: Sitting   Cuff Size: Adult   Weight: 119 kg (262 lb 9.4 oz)   Height: 182.9 cm (72.01\")     Body mass index is 35.61 kg/m².     Physical Exam  Constitutional: NAD, WDWN.   Neurological: A + O x 3.   Psychiatric:  Normal mood and affect    Labs  Brief Urine Lab Results       None            Lab Results   Component Value Date    GLUCOSE 98 06/24/2019    CALCIUM 9.4 06/24/2019     06/24/2019    K 3.8 06/24/2019    CO2 24.0 06/24/2019     06/24/2019    BUN 14 06/24/2019    CREATININE 1.00 06/24/2019    EGFRIFNONA 79 06/24/2019    BCR 14.1 06/24/2019    ANIONGAP 15.0 06/24/2019       Lab Results   Component Value Date    WBC 5.10 06/24/2019    HGB 16.5 09/11/2023    HCT 49.6 09/11/2023    MCV 96.1 06/24/2019     06/24/2019            Radiographic Studies  No Images in the past 120 days found..    I have reviewed the above labs and imaging.     Assessment / Plan      Assessment/Plan:   Diagnoses and all orders for this visit:    1. Benign prostatic hyperplasia, unspecified whether lower urinary tract symptoms present (Primary)  -     PSA DIAGNOSTIC; Future    2. Low testosterone in male    58-year-old male with a history of hypogonadism doing well on 100 mg testosterone cypionate weekly we reviewed total T, hematocrit and hemoglobin are within normal limits.  Yearly PSA is due this month we will " have patient obtain today and refill testosterone cypionate.    Obtain PSA today  Obtain TT, hematocrit and hemoglobin 6 months    Follow Up:   Return in about 6 months (around 5/13/2024) for Follow up MARGOT Parker,NP-C  [facility]

## 2023-11-16 ENCOUNTER — TELEPHONE (OUTPATIENT)
Dept: UROLOGY | Facility: CLINIC | Age: 58
End: 2023-11-16

## 2023-11-16 NOTE — TELEPHONE ENCOUNTER
[facility] received a fax that requires your attention. The document has been indexed to the patient's chart for your review.    Reason for sending: PREVIOUS LAB RESULTS FOR REVIEW    Documents Description: HGBA1C/PSA/MULTI_QUEST DIAG_07/25/23    Name of Sender: [facility]    Date Indexed: 11/16/23

## 2023-11-18 DIAGNOSIS — R79.89 LOW TESTOSTERONE IN MALE: ICD-10-CM

## 2023-11-20 RX ORDER — ANASTROZOLE 1 MG/1
TABLET ORAL
Qty: 12 TABLET | Refills: 3 | Status: SHIPPED | OUTPATIENT
Start: 2023-11-20

## 2023-12-11 DIAGNOSIS — R79.89 LOW TESTOSTERONE IN MALE: Primary | ICD-10-CM

## 2023-12-11 RX ORDER — SYRINGE WITH NEEDLE, 1 ML 25GX5/8"
SYRINGE, EMPTY DISPOSABLE MISCELLANEOUS
Qty: 50 EACH | Refills: 3 | Status: SHIPPED | OUTPATIENT
Start: 2023-12-11

## 2023-12-28 RX ORDER — IPRATROPIUM BROMIDE AND ALBUTEROL SULFATE 2.5; .5 MG/3ML; MG/3ML
3 SOLUTION RESPIRATORY (INHALATION)
Status: CANCELLED | OUTPATIENT
Start: 2023-12-28

## 2023-12-28 NOTE — PRE-PROCEDURE INSTRUCTIONS
PAT phone history completed with pt for upcoming procedure on  January 5th, 2024.     PAT PASS GIVEN/REVIEWED WITH PT.  VERBALIZED UNDERSTANDING OF THE FOLLOWING:  DO NOT EAT, DRINK, SMOKE, USE SMOKELESS TOBACCO OR CHEW GUM AFTER MIDNIGHT THE NIGHT BEFORE SURGERY.  THIS ALSO INCLUDES HARD CANDIES AND MINTS.    DO NOT SHAVE THE AREA TO BE OPERATED ON AT LEAST 48 HOURS PRIOR TO THE PROCEDURE.  DO NOT WEAR MAKE UP OR NAIL POLISH.  DO NOT LEAVE IN ANY PIERCING OR WEAR JEWELRY THE DAY OF SURGERY.      DO NOT USE ADHESIVES IF YOU WEAR DENTURES.    DO NOT WEAR EYE CONTACTS; BRING IN YOUR GLASSES.    ONLY TAKE MEDICATION THE MORNING OF YOUR PROCEDURE IF INSTRUCTED BY YOUR SURGEON WITH ENOUGH WATER TO SWALLOW THE MEDICATION.  IF YOUR SURGEON DID NOT SPECIFY WHICH MEDICATIONS TO TAKE, YOU WILL NEED TO CALL THEIR OFFICE FOR FURTHER INSTRUCTIONS AND DO AS THEY INSTRUCT.    LEAVE ANYTHING YOU CONSIDER VALUABLE AT HOME.    YOU WILL NEED TO ARRANGE FOR SOMEONE TO DRIVE YOU HOME AFTER SURGERY.  IT IS RECOMMENDED THAT YOU DO NOT DRIVE, WORK, DRINK ALCOHOL OR MAKE MAJOR DECISIONS FOR AT LEAST 24 HOURS AFTER YOUR PROCEDURE IS COMPLETE.      THE DAY OF YOUR PROCEDURE, BRING IN THE FOLLOWING IF APPLICABLE:   PICTURE ID AND INSURANCE/MEDICARE OR MEDICAID CARDS/ANY CO-PAY THAT MAY BE DUE   COPY OF ADVANCED DIRECTIVE/LIVING WILL/POWER OR    CPAP/BIPAP/INHALERS   SKIN PREP SHEET   YOUR PREADMISSION TESTING PASS (IF NOT A PHONE HISTORY)    Medication instructions given to pt by RN per anesthesia protocol.  Pt referred back to surgeon for further instructions if he/she is on any blood thinners.

## 2024-01-05 ENCOUNTER — ANESTHESIA (OUTPATIENT)
Dept: GASTROENTEROLOGY | Facility: HOSPITAL | Age: 59
End: 2024-01-05
Payer: MEDICARE

## 2024-01-05 ENCOUNTER — HOSPITAL ENCOUNTER (OUTPATIENT)
Facility: HOSPITAL | Age: 59
Setting detail: HOSPITAL OUTPATIENT SURGERY
Discharge: HOME | End: 2024-01-05
Attending: INTERNAL MEDICINE | Admitting: INTERNAL MEDICINE
Payer: MEDICARE

## 2024-01-05 ENCOUNTER — ANESTHESIA EVENT (OUTPATIENT)
Dept: GASTROENTEROLOGY | Facility: HOSPITAL | Age: 59
End: 2024-01-05
Payer: MEDICARE

## 2024-01-05 VITALS
TEMPERATURE: 97.6 F | HEIGHT: 71 IN | OXYGEN SATURATION: 93 % | WEIGHT: 260 LBS | BODY MASS INDEX: 36.4 KG/M2 | HEART RATE: 73 BPM | DIASTOLIC BLOOD PRESSURE: 72 MMHG | RESPIRATION RATE: 18 BRPM | SYSTOLIC BLOOD PRESSURE: 107 MMHG

## 2024-01-05 DIAGNOSIS — Z80.0 FAMILY HISTORY OF COLON CANCER: ICD-10-CM

## 2024-01-05 DIAGNOSIS — D12.6 SERRATED ADENOMA OF COLON: ICD-10-CM

## 2024-01-05 DIAGNOSIS — D12.6 TUBULAR ADENOMA OF COLON: ICD-10-CM

## 2024-01-05 PROCEDURE — 25810000003 SODIUM CHLORIDE 0.9 % SOLUTION: Performed by: PHYSICIAN ASSISTANT

## 2024-01-05 PROCEDURE — 25010000002 PROPOFOL 10 MG/ML EMULSION: Performed by: NURSE ANESTHETIST, CERTIFIED REGISTERED

## 2024-01-05 PROCEDURE — 25810000003 SODIUM CHLORIDE 0.9 % SOLUTION: Performed by: NURSE ANESTHETIST, CERTIFIED REGISTERED

## 2024-01-05 PROCEDURE — 45385 COLONOSCOPY W/LESION REMOVAL: CPT | Performed by: INTERNAL MEDICINE

## 2024-01-05 RX ORDER — SODIUM CHLORIDE 9 MG/ML
30 INJECTION, SOLUTION INTRAVENOUS CONTINUOUS PRN
Status: DISCONTINUED | OUTPATIENT
Start: 2024-01-05 | End: 2024-01-05 | Stop reason: HOSPADM

## 2024-01-05 RX ORDER — MAGNESIUM HYDROXIDE 1200 MG/15ML
LIQUID ORAL AS NEEDED
Status: DISCONTINUED | OUTPATIENT
Start: 2024-01-05 | End: 2024-01-05 | Stop reason: HOSPADM

## 2024-01-05 RX ORDER — IPRATROPIUM BROMIDE AND ALBUTEROL SULFATE 2.5; .5 MG/3ML; MG/3ML
3 SOLUTION RESPIRATORY (INHALATION) ONCE
Status: COMPLETED | OUTPATIENT
Start: 2024-01-05 | End: 2024-01-05

## 2024-01-05 RX ORDER — SODIUM CHLORIDE 9 MG/ML
INJECTION, SOLUTION INTRAVENOUS CONTINUOUS PRN
Status: DISCONTINUED | OUTPATIENT
Start: 2024-01-05 | End: 2024-01-05 | Stop reason: SURG

## 2024-01-05 RX ORDER — PROPOFOL 10 MG/ML
VIAL (ML) INTRAVENOUS AS NEEDED
Status: DISCONTINUED | OUTPATIENT
Start: 2024-01-05 | End: 2024-01-05 | Stop reason: SURG

## 2024-01-05 RX ORDER — SIMETHICONE 20 MG/.3ML
EMULSION ORAL AS NEEDED
Status: DISCONTINUED | OUTPATIENT
Start: 2024-01-05 | End: 2024-01-05 | Stop reason: HOSPADM

## 2024-01-05 RX ADMIN — PROPOFOL 500 MG: 10 INJECTION, EMULSION INTRAVENOUS at 12:04

## 2024-01-05 RX ADMIN — LIDOCAINE HYDROCHLORIDE 40 MG: 20 INJECTION, SOLUTION INTRAVENOUS at 12:04

## 2024-01-05 RX ADMIN — SODIUM CHLORIDE 30 ML/HR: 9 INJECTION, SOLUTION INTRAVENOUS at 10:49

## 2024-01-05 RX ADMIN — SODIUM CHLORIDE: 9 INJECTION, SOLUTION INTRAVENOUS at 11:59

## 2024-01-05 RX ADMIN — IPRATROPIUM BROMIDE AND ALBUTEROL SULFATE 3 ML: .5; 3 SOLUTION RESPIRATORY (INHALATION) at 11:03

## 2024-01-05 NOTE — H&P
[facility]  HISTORY AND PHYSICAL    Patient Name: Charly Braun  : 1965  MRN: [id number]    Chief Complaint:   For surveillance colonoscopy    History Of Presenting Illness:    H/o colon polyps more than 10 adenomas in 2023    Past Medical History:   Diagnosis Date    Acid reflux     Arthritis     Back pain     Degenerative disc disease at L5-S1 level     Depression     Essential hypertension 2019    GERD (gastroesophageal reflux disease)     Gout     Hyperlipidemia     Hypertension     Irregular heart beat        Past Surgical History:   Procedure Laterality Date    CARDIAC CATHETERIZATION N/A 2019    Procedure: Left Heart Cath;  Surgeon: Karen Garcia MD;  Location:  [facility];  Service: Cardiology    COLONOSCOPY      Dr. Spring- normal    COLONOSCOPY N/A 2023    Procedure: COLONOSCOPY FOR SCREENING CPT CODE: , polypectomies;  Surgeon: Chapin Murillo MD;  Location: [facility] ENDOSCOPY;  Service: Gastroenterology;  Laterality: N/A;    GALLBLADDER SURGERY      SPINE SURGERY         Social History     Socioeconomic History    Marital status:    Tobacco Use    Smoking status: Never    Smokeless tobacco: Never   Vaping Use    Vaping Use: Never used   Substance and Sexual Activity    Alcohol use: No    Drug use: No    Sexual activity: Defer       Family History   Problem Relation Age of Onset    Hypertension Mother     COPD Father     Colon polyps Father     Colon cancer Brother     Heart disease Brother        Prior to Admission Medications:  Medications Prior to Admission   Medication Sig Dispense Refill Last Dose    albuterol sulfate HFA (Ventolin HFA) 108 (90 Base) MCG/ACT inhaler Inhale 2 puffs Every 4 (Four) Hours As Needed for Wheezing or Shortness of Air. 18 g 5 2024    allopurinol (ZYLOPRIM) 100 MG tablet Take 1 tablet by mouth Daily.   2024    anastrozole (ARIMIDEX) 1 MG tablet TAKE 1 TABLET BY MOUTH ONCE  "A WEEK ON DAY OF TESTOSTERONE INJECTION 12 tablet 3 1/4/2024    atorvastatin (LIPITOR) 20 MG tablet Take 1 tablet by mouth Daily.   1/4/2024    B-D 3CC LUER-ZELALEM SYR 25GX1\" 25G X 1\" 3 ML misc USE ONCE A WEEK   Past Week    bisoprolol (ZEBeta) 5 MG tablet Take 1.5 tablets by mouth Daily. 135 tablet 3 1/4/2024    Cholecalciferol (Vitamin D3) 50 MCG (2000 UT) capsule Take 1 capsule by mouth Daily.   1/4/2024    FLUoxetine (PROzac) 40 MG capsule Take 1 capsule by mouth Daily.   1/4/2024    fluticasone (FLONASE) 50 MCG/ACT nasal spray 2 sprays into the nostril(s) as directed by provider Daily. 16 g 5 1/4/2024    Fluticasone Furoate-Vilanterol (Breo Ellipta) 200-25 MCG/ACT inhaler Inhale 1 puff Daily. 60 each 2 1/5/2024    HYDROcodone-acetaminophen (NORCO) 7.5-325 MG per tablet Take 1 tablet by mouth Every 6 (Six) Hours As Needed for Moderate Pain.   1/4/2024    ibuprofen (ADVIL,MOTRIN) 800 MG tablet Take 1 tablet by mouth Every 6 (Six) Hours As Needed.  0 Past Week    losartan (COZAAR) 25 MG tablet Take 1 tablet by mouth Daily.   1/4/2024    Needle, Disp, 18G X 1-1/2\" misc Use for drawing up the medication 50 each 3 Past Week    Needle, Disp, 23G X 1-1/2\" misc 1 Device 1 (One) Time Per Week. 50 each 11 Past Week    pantoprazole (PROTONIX) 40 MG EC tablet Take 1 tablet by mouth Daily. 30 tablet 2 1/4/2024    Sod Picosulfate-Mag Ox-Cit Acd 10-3.5-12 MG-GM-GM pack Take 350 mL by mouth Take As Directed. Please follow instructions given at office 2 each 0 1/4/2024    Syringe/Needle, Disp, (B-D 3CC LUER-ZELALEM SYR 25GX1/2\") 25G X 1-1/2\" 3 ML misc USE 1 NEEDLE EVERY WEEK 50 each 3 Past Week    Testosterone Cypionate (DEPOTESTOTERONE CYPIONATE) 200 MG/ML injection Inject 0.5 mL into the appropriate muscle as directed by prescriber Every 7 (Seven) Days. Waste 0.5 mL from each vial.  With last injection request refills 4 mL 0 Past Week    vitamin B-12 (CYANOCOBALAMIN) 1000 MCG tablet TAKE 1 TABLET BY MOUTH 1 TIME   1/4/2024    " cyclobenzaprine (FLEXERIL) 10 MG tablet 1 tablet 2 (Two) Times a Day As Needed.   More than a month       Allergies:  Allergies   Allergen Reactions    Tall Ragweed Irritability    Lisinopril Cough        Vitals: Temp:  [97 °F (36.1 °C)] 97 °F (36.1 °C)  Heart Rate:  [70] 70  Resp:  [18] 18  BP: (115)/(92) 115/92    Review Of Systems:  Constitutional:  Negative for chills, fever, and unexpected weight change.  Respiratory:  Negative for cough, chest tightness, shortness of breath, and wheezing.  Cardiovascular:  Negative for chest pain, palpitations, and leg swelling.  Gastrointestinal:  Negative for abdominal distention, abdominal pain, nausea, vomiting.  Neurological:  Negative for weakness, numbness, and headaches.     Physical Exam:    General Appearance:  Alert, cooperative, in no acute distress.   Lungs:   Clear to auscultation, respirations regular, even and                 unlabored.   Heart:  Regular rhythm and normal rate.   Abdomen:   Normal bowel sounds, no masses, no organomegaly. Soft, nontender, nondistended   Neurologic: Alert and oriented x 3. Moves all four limbs equally       Assessment & Plan     Assessment:  Active Problems:    Family history of colon cancer    Serrated adenoma of colon    Tubular adenoma of colon      Plan: COLONOSCOPY FOR SCREENING CPT CODE:  (N/A)     Chapin Murillo MD  1/5/2024

## 2024-01-05 NOTE — ANESTHESIA POSTPROCEDURE EVALUATION
Patient: Charly Braun    Procedure Summary       Date: 01/05/24 Room / Location: [facility] ENDOSCOPY 2 / [facility] ENDOSCOPY    Anesthesia Start: 1159 Anesthesia Stop: 1235    Procedure: COLONOSCOPY with polypectomy Diagnosis:       Serrated adenoma of colon      Tubular adenoma of colon      Family history of colon cancer      (Serrated adenoma of colon [D12.6])      (Tubular adenoma of colon [D12.6])      (Family history of colon cancer [Z80.0])    Surgeons: Chapin Murillo MD Provider: Ellis Abraham CRNA    Anesthesia Type: MAC ASA Status: 3            Anesthesia Type: MAC    Vitals  Vitals Value Taken Time   /72 01/05/24 1259   Temp 97.6 °F (36.4 °C) 01/05/24 1238   Pulse 85 01/05/24 1238   Resp 18 01/05/24 1238   SpO2 93 % 01/05/24 1300   Vitals shown include unfiled device data.        Post Anesthesia Care and Evaluation    Patient location during evaluation: PHASE II  Patient participation: complete - patient participated  Level of consciousness: awake and alert  Pain management: adequate    Airway patency: patent  Anesthetic complications: No anesthetic complications  PONV Status: none  Cardiovascular status: acceptable  Respiratory status: acceptable  Hydration status: acceptable  No anesthesia care post op

## 2024-01-05 NOTE — DISCHARGE INSTRUCTIONS
- Discharge patient to home (ambulatory).   - High fiber diet.   - Continue present medications.   - Await pathology results.   - Repeat colonoscopy in 3 years for surveillance.   - Return to GI office in 8 weeks.

## 2024-01-05 NOTE — ANESTHESIA PREPROCEDURE EVALUATION
Anesthesia Evaluation     Patient summary reviewed and Nursing notes reviewed   NPO Solid Status: > 8 hours  NPO Liquid Status: > 8 hours           Airway   Mallampati: II  TM distance: >3 FB  Neck ROM: full  Possible difficult intubation  Dental - normal exam     Pulmonary - negative pulmonary ROS and normal exam   (-) not a smoker  Cardiovascular - normal exam  Exercise tolerance: good (4-7 METS)    ECG reviewed  Patient on routine beta blocker    (+) hypertension 2 medications or greater, dysrhythmias (palpitations, NSVT), hyperlipidemia    ROS comment: 9/21/23 EKG - nsr w/ nonspecific t-wave abnormality      6/24/19 echo-  · Estimated EF = 60%.  · Left ventricular systolic function is normal.  · Trace to mild mitral and tricuspid regurgitation      Neuro/Psych  (+) psychiatric history Depression  GI/Hepatic/Renal/Endo    (+) obesity, morbid obesity, GERD    Musculoskeletal     (+) back pain  Abdominal    Substance History - negative use     OB/GYN negative ob/gyn ROS         Other   arthritis,                       Anesthesia Plan    ASA 3     MAC     (Risks and benefits discussed including risk of aspiration, recall and dental damage. All patient questions answered.    Will continue with plan of care.)  intravenous induction     Anesthetic plan, risks, benefits, and alternatives have been provided, discussed and informed consent has been obtained with: patient.  Pre-procedure education provided  Plan discussed with CRNA.        CODE STATUS:

## 2024-01-09 LAB — REF LAB TEST METHOD: NORMAL

## 2024-01-30 ENCOUNTER — TELEPHONE (OUTPATIENT)
Dept: UROLOGY | Facility: CLINIC | Age: 59
End: 2024-01-30

## 2024-01-30 DIAGNOSIS — R79.89 LOW TESTOSTERONE IN MALE: ICD-10-CM

## 2024-01-30 RX ORDER — SYRINGE WITH NEEDLE, 1 ML 25GX5/8"
SYRINGE, EMPTY DISPOSABLE MISCELLANEOUS
Qty: 50 EACH | Refills: 11 | Status: SHIPPED | OUTPATIENT
Start: 2024-01-30

## 2024-01-30 RX ORDER — TESTOSTERONE CYPIONATE 200 MG/ML
100 INJECTION, SOLUTION INTRAMUSCULAR
Qty: 4 ML | Refills: 0 | Status: SHIPPED | OUTPATIENT
Start: 2024-01-30

## 2024-01-30 NOTE — TELEPHONE ENCOUNTER
"Caller: KACEY ALICIA     Relationship: SELF    Best call back number: [phone]    Requested Prescriptions: Testosterone Cypionate (DEPOTESTOTERONE CYPIONATE) 200 MG/ML injection    Syringe/Needle, Disp, (B-D 3CC LUER-ZELALEM SYR 25GX1/2\") 25G X 1-1/2\" 3 ML    Requested Prescriptions      No prescriptions requested or ordered in this encounter     Pharmacy where request should be sent:  [id number] - [place], [place] - [address] AT [facility] [address] - [phone]  - [phone] FX  [address] [place] [address]  Phone: [phone]  Fax: [phone]    Last office visit with prescribing clinician: 11/13/2023   Next office visit with prescribing clinician: 5/13/2024     Additional details provided by patient: PATIENT SAID HE WENT TO PHARMACY AND THEY SENT IT BACK? INFORMED HIM I WOULD SEND A REQUEST OVER FOR A MED REFILL    Does the patient have less than a 3 day supply:  [] Yes  [x] No    Corey Kessler   01/30/24 12:45 EST         "
no dermatitis, no environmental allergies, no food allergies, no immunosuppressive disorder, and no pruritus.

## 2024-03-06 ENCOUNTER — OFFICE VISIT (OUTPATIENT)
Dept: GASTROENTEROLOGY | Facility: CLINIC | Age: 59
End: 2024-03-06
Payer: MEDICARE

## 2024-03-06 VITALS
SYSTOLIC BLOOD PRESSURE: 102 MMHG | DIASTOLIC BLOOD PRESSURE: 64 MMHG | BODY MASS INDEX: 35.01 KG/M2 | WEIGHT: 251 LBS | HEART RATE: 65 BPM | OXYGEN SATURATION: 95 %

## 2024-03-06 DIAGNOSIS — E66.9 CLASS 2 OBESITY WITH BODY MASS INDEX (BMI) OF 35.0 TO 35.9 IN ADULT, UNSPECIFIED OBESITY TYPE, UNSPECIFIED WHETHER SERIOUS COMORBIDITY PRESENT: ICD-10-CM

## 2024-03-06 DIAGNOSIS — D12.6 TUBULAR ADENOMA OF COLON: Primary | ICD-10-CM

## 2024-03-06 DIAGNOSIS — K76.0 NAFLD (NONALCOHOLIC FATTY LIVER DISEASE): ICD-10-CM

## 2024-03-06 DIAGNOSIS — R74.8 ELEVATED LIVER ENZYMES: ICD-10-CM

## 2024-03-06 RX ORDER — CETIRIZINE HYDROCHLORIDE 10 MG/1
1 TABLET ORAL DAILY
COMMUNITY
Start: 2024-02-27

## 2024-03-06 RX ORDER — AMOXICILLIN AND CLAVULANATE POTASSIUM 875; 125 MG/1; MG/1
1 TABLET, FILM COATED ORAL EVERY 12 HOURS SCHEDULED
COMMUNITY
Start: 2024-02-27

## 2024-03-06 NOTE — PATIENT INSTRUCTIONS
Fiber Foods  It is recommended that you consume 25-45 grams daily.    Fresh & Dried Fruit  Serving Size Fiber (g)    Apples with skin  1 medium 5.0    Apricot  3 medium 1.0    Apricots, dried  4 pieces 2.9    Banana  1 medium 3.9    Blueberries  1 cup 4.2    Cantaloupe, cubes  1 cup 1.3    Figs, dried  2 medium 3.7    Grapefruit  1/2 medium 3.1    Orange, navel  1 medium 3.4    Peach  1 medium 2.0    Peaches, dried  3 pieces 3.2    Pear  1 medium 5.1    Plum  1 medium 1.1    Raisins  1.5 oz box 1.6    Raspberries  1 cup 6.4    Strawberries  1 cup 4.4      Grains, Beans, Nuts & Seeds  Serving Size Fiber (g)    Almonds  1 oz 4.2    Black beans, cooked  1 cup 13.9    Bran cereal  1 cup 19.9    Bread, whole wheat  1 slice 2.0    Brown rice, dry  1 cup 7.9    Cashews  1 oz 1.0    Flax seeds  3 Tbsp. 6.9    Garbanzo beans, cooked  1 cup 5.8    Kidney beans, cooked  1 cup 11.6    Lentils, red cooked  1 cup 13.6    Lima beans, cooked  1 cup 8.6    Oats, rolled dry  1 cup 12.0    Quinoa (seeds) dry  1/4 cup 6.2    Quinoa, cooked  1 cup 8.4    Pasta, whole wheat  1 cup 6.3    Peanuts  1 oz 2.3    Pistachio nuts  1 oz 3.1    Pumpkin seeds  1/4 cup 4.1    Soybeans, cooked  1 cup 8.6    Sunflower seeds  1/4 cup 3.0    Walnuts  1 oz 3.1            Vegetables  Serving Size Fiber (g)    Avocado (fruit)  1 medium 11.8    Beets, cooked  1 cup 2.8    Beet greens  1 cup 4.2    Bok boone, cooked  1 cup 2.8    Broccoli, cooked  1 cup 4.5    [place] sprouts, cooked  1 cup 3.6    Cabbage, cooked  1 cup 4.2    Carrot  1 medium 2.6    Carrot, cooked  1 cup 5.2    Cauliflower, cooked  1 cup 3.4    Gagandeep slaw  1 cup 4.0    Gallito greens, cooked  1 cup 2.6    Corn, sweet  1 cup 4.6    Green beans  1 cup 4.0    Celery  1 stalk 1.1    Kale, cooked  1 cup 7.2    Onions, raw  1 cup 2.9    Peas, cooked  1 cup 8.8    Peppers, sweet  1 cup 2.6    Pop corn, air-popped  3 cups 3.6    Potato, baked w/ skin  1 medium 4.8    Spinach, cooked  1 cup 4.3     Summer squash, cooked  1 cup 2.5    Sweet potato, cooked  1 medium 4.9    Swiss chard, cooked  1 cup 3.7    Tomato  1 medium 1.0    Winter squash, cooked  1 cup 6.2    Zucchini, cooked  1 cup 2.6

## 2024-03-06 NOTE — PROGRESS NOTES
Follow Up Note     Date: 2024   Patient Name: Charly Braun  MRN: [id number]  : 1965     Primary Care Provider: Марина Foster APRN     Chief Complaint   Patient presents with    Results     Colonoscopy      History of present illness:   3/6/2024  Charly Braun is a 58 y.o. male who is here today for follow up regarding Results (Colonoscopy).    Trying to modify his diet to lose weight. He does not drink alcohol. He is worried about his liver. Had colonoscopy since last visit and would like to discuss those results.     Interval History:  10/27/2022  He was told recently that he has elevated liver enzymes. He had an ultrasound a few months ago which showed fatty liver. He has been trying to lose weight over the past couple of months and states that his liver enzymes improved some. He denies any past history of alcoholism, no current alcohol use. He does have history of elevated cholesterol, taking atorvastatin. No prior history of diabetes.      His last colonoscopy was in  by Dr. Spring and normal. His brother had colon cancer when he was in his 20s. He is having BMs daily, no rectal bleeding. No abdominal pain.      He has long history of reflux. Was previously taking protonix 20 mg once daily and a couple of years ago was increased to 40 mg once daily. He has continued it daily since then. Prior to starting this medication he had significant reflux symptoms. No current heartburn now. No dysphagia. No prior EGD.     Subjective     Past Medical History:   Diagnosis Date    Acid reflux     Arthritis     Back pain     Degenerative disc disease at L5-S1 level     Depression     Essential hypertension 2019    GERD (gastroesophageal reflux disease)     Gout     Hyperlipidemia     Hypertension     Irregular heart beat      Past Surgical History:   Procedure Laterality Date    CARDIAC CATHETERIZATION N/A 2019    Procedure: Left Heart Cath;  Surgeon: Karen Garcia MD;   "Location:  [facility];  Service: Cardiology    COLONOSCOPY  2014    Dr. Spring- normal    COLONOSCOPY N/A 1/16/2023    Procedure: COLONOSCOPY FOR SCREENING CPT CODE: , polypectomies;  Surgeon: Chapin Murillo MD;  Location: [facility] ENDOSCOPY;  Service: Gastroenterology;  Laterality: N/A;    COLONOSCOPY N/A 1/5/2024    Procedure: COLONOSCOPY with polypectomy;  Surgeon: Chapin Murillo MD;  Location: [facility] ENDOSCOPY;  Service: Gastroenterology;  Laterality: N/A;    GALLBLADDER SURGERY      SPINE SURGERY       Family History   Problem Relation Age of Onset    Hypertension Mother     COPD Father     Colon polyps Father     Colon cancer Brother     Heart disease Brother      Social History     Socioeconomic History    Marital status:    Tobacco Use    Smoking status: Never    Smokeless tobacco: Never   Vaping Use    Vaping status: Never Used   Substance and Sexual Activity    Alcohol use: No    Drug use: No    Sexual activity: Defer     Current Outpatient Medications:     albuterol sulfate HFA (Ventolin HFA) 108 (90 Base) MCG/ACT inhaler, Inhale 2 puffs Every 4 (Four) Hours As Needed for Wheezing or Shortness of Air., Disp: 18 g, Rfl: 5    allopurinol (ZYLOPRIM) 100 MG tablet, Take 1 tablet by mouth Daily., Disp: , Rfl:     amoxicillin-clavulanate (AUGMENTIN) 875-125 MG per tablet, Take 1 tablet by mouth Every 12 (Twelve) Hours., Disp: , Rfl:     anastrozole (ARIMIDEX) 1 MG tablet, TAKE 1 TABLET BY MOUTH ONCE A WEEK ON DAY OF TESTOSTERONE INJECTION, Disp: 12 tablet, Rfl: 3    atorvastatin (LIPITOR) 20 MG tablet, Take 1 tablet by mouth Daily., Disp: , Rfl:     B-D 3CC LUER-ZELALEM SYR 25GX1\" 25G X 1\" 3 ML misc, USE ONCE A WEEK, Disp: , Rfl:     bisoprolol (ZEBeta) 5 MG tablet, Take 1.5 tablets by mouth Daily., Disp: 135 tablet, Rfl: 3    cetirizine (zyrTEC) 10 MG tablet, Take 1 tablet by mouth Daily., Disp: , Rfl:     Cholecalciferol (Vitamin D3) 50 MCG (2000 UT) capsule, Take 1 " "capsule by mouth Daily., Disp: , Rfl:     FLUoxetine (PROzac) 40 MG capsule, Take 1 capsule by mouth Daily., Disp: , Rfl:     fluticasone (FLONASE) 50 MCG/ACT nasal spray, 2 sprays into the nostril(s) as directed by provider Daily., Disp: 16 g, Rfl: 5    Fluticasone Furoate-Vilanterol (Breo Ellipta) 200-25 MCG/ACT inhaler, Inhale 1 puff Daily., Disp: 60 each, Rfl: 2    HYDROcodone-acetaminophen (NORCO) 7.5-325 MG per tablet, Take 1 tablet by mouth Every 6 (Six) Hours As Needed for Moderate Pain., Disp: , Rfl:     ibuprofen (ADVIL,MOTRIN) 800 MG tablet, Take 1 tablet by mouth Every 6 (Six) Hours As Needed., Disp: , Rfl: 0    losartan (COZAAR) 25 MG tablet, Take 1 tablet by mouth Daily., Disp: , Rfl:     Needle, Disp, 18G X 1-1/2\" misc, Use for drawing up the medication, Disp: 50 each, Rfl: 3    Needle, Disp, 23G X 1-1/2\" misc, 1 Device 1 (One) Time Per Week., Disp: 50 each, Rfl: 11    pantoprazole (PROTONIX) 40 MG EC tablet, Take 1 tablet by mouth Daily., Disp: 30 tablet, Rfl: 2    Syringe/Needle, Disp, (B-D 3CC LUER-ZELALEM SYR 25GX1/2\") 25G X 1-1/2\" 3 ML misc, Use 1 syringe weekly for injection, Disp: 50 each, Rfl: 11    Testosterone Cypionate (DEPOTESTOTERONE CYPIONATE) 200 MG/ML injection, Inject 0.5 mL into the appropriate muscle as directed by prescriber Every 7 (Seven) Days. Waste 0.5 mL from each vial.  With last injection request refills, Disp: 4 mL, Rfl: 0    vitamin B-12 (CYANOCOBALAMIN) 1000 MCG tablet, TAKE 1 TABLET BY MOUTH 1 TIME, Disp: , Rfl:     Allergies   Allergen Reactions    Tall Ragweed Irritability    Lisinopril Cough     The following portions of the patient's history were reviewed and updated as appropriate: allergies, current medications, past family history, past medical history, past social history, past surgical history and problem list.    Objective     Physical Exam  Constitutional:       General: He is not in acute distress.     Appearance: Normal appearance. He is well-developed. He is " obese. He is not diaphoretic.      Comments: pleasant   HENT:      Head: Normocephalic and atraumatic.      Right Ear: External ear normal.      Left Ear: External ear normal.      Nose: Nose normal.   Eyes:      General: No scleral icterus.        Right eye: No discharge.         Left eye: No discharge.      Conjunctiva/sclera: Conjunctivae normal.   Neck:      Trachea: No tracheal deviation.   Pulmonary:      Effort: Pulmonary effort is normal. No respiratory distress.   Musculoskeletal:         General: Normal range of motion.      Cervical back: Normal range of motion.   Skin:     Coloration: Skin is not pale.      Findings: Erythema (mild diffuse, facial) present. No rash.   Neurological:      Mental Status: He is alert and oriented to person, place, and time.      Coordination: Coordination normal.   Psychiatric:         Mood and Affect: Mood normal.         Behavior: Behavior normal.         Thought Content: Thought content normal.         Judgment: Judgment normal.       Vitals:    03/06/24 0927   BP: 102/64   Pulse: 65   SpO2: 95%   Weight: 114 kg (251 lb)     Results Review:   I reviewed the patient's new clinical results.    Labs 2/2024 Hgb A1c 5.9, platelets 255,000, Hgb 16.8, alk phos 53, AST 52, ALT 74, bili 0.8.    Colonoscopy by Dr. Murillo 1/16/2023  - One 4 mm polyp in the cecum, removed with a cold snare. Resected and retrieved.  - One 6 mm polyp in the proximal transverse colon, removed with a cold snare. Resected and retrieved.  - One 5 mm polyp in the proximal descending colon, removed with a cold snare. Resected and retrieved.  - One 8 to 10 mm polyp in the proximal ascending colon, removed with a hot snare. Resected and retrieved.  - Two 3 to 4 mm polyps in the mid ascending colon and in the distal ascending colon, removed with a cold snare. Resected and retrieved.  - One 4 mm polyp in the proximal sigmoid colon, removed with a cold snare. Resected and retrieved.  - One 5 mm polyp in the  proximal sigmoid colon, removed with a cold snare. Resected and retrieved.  - Three 3 to 4 mm polyps in the mid sigmoid colon and in the distal sigmoid colon, removed with a cold snare. Resected and retrieved.  - Non-bleeding external and internal hemorrhoids.  - The examined portion of the ileum was normal.  Pathology DIAGNOSIS:   A.   CECUM POLYP:   Tubular adenoma; negative for high-grade dysplasia.   B.   ASCENDING COLON POLYP:   Sessile serrated lesion without cytologic dysplasia.   C.   DESCENDING COLON POLYPS, X4:   Tubular adenoma; negative for high-grade dysplasia.   Multiple portions of hyperplastic polyp(s).   D.   SIGMOID COLON POLYPS, X5:   Multiple portions of tubular adenoma(s); negative for high-grade dysplasia.      US gb 7/2022   1.  Hepatic steatosis.   2.  Status post cholecystectomy    Colonoscopy 1/5/2024  - Preparation of the colon was fair. Subopitmal prep with thick sticky stool - Hemorrhoids found on perianal exam. - One 4 mm polyp at the hepatic flexure, removed with a cold snare. Resected and retrieved. - One 3 to 4 mm polyp at the hepatic flexure, removed with a cold snare. Resected and retrieved. - One 3 mm polyp in the proximal descending colon, removed with a cold snare. Resected and retrieved. - Two 3 to 4 mm polyps in the sigmoid colon, removed with a cold snare. Resected and retrieved. - Two 2 to 4 mm polyps in the rectum and at the recto-sigmoid colon, removed with a cold snare. Resected and retrieved. - Non-bleeding internal hemorrhoids. - The examined portion of the ileum was normal. - Stool in the entire examined colon.  Pathology DIAGNOSIS:  A.   DESCENDING COLON POLYP:  Tubular adenoma.  Negative for high grade dysplasia.  B.   HEPATIC FLEXURE POLYP, X2:  Tubular adenoma x 2.  Negative for high-grade dysplasia.  C.   SIGMOID COLON POLYP, X2:  Tubular adenoma, multiple fragments.  Negative for high-grade dysplasia.  D.   RECTAL POLYP, X2:  Hyperplastic polyp.     Assessment /  Plan      1. Tubular adenoma of colon  Colonoscopy 1/2024 had fair prep. Had 7 colon polyps removed, all small and all were tubular adenoma except for hyperplastic rectal polyp x2. Colonoscopy 1/2023, had 11 colon polyps all small tubular adenomas without dysplasia except one in the ascending colon which was 8-10 mm in size and was serrated adenoma. His brother had colon cancer when he was very young (in his 20s) and has colectomy. His father had colon polyps.     High fiber diet  Repeat colonoscopy in 3 years for surveillance, due 1/2027    2. NAFLD (nonalcoholic fatty liver disease)  3. Class 2 obesity with body mass index (BMI) of 35.0 to 35.9 in adult, unspecified obesity type, unspecified whether serious comorbidity present  4. Elevated liver enzymes  He has known fatty liver disease, found on imaging 2022. He has mildly elevated liver enzymes on past labs, recent labs 2/2024 with AST 52, ALT 74, alk phos normal, bilirubin 0.8. Cholesterol has been elevated in the past, recently normal on atorvastatin. His BMI is 35, has been trying to lose weight. He is a nonalcoholic. Suspect mildly elevated liver enzymes in hepatocellular pattern due to his fatty liver disease. Expect improvement in liver enzymes as he loses weight and changes his diet. Liver enzymes stable 7/2023 to 2/2024.     Continue to work on weight loss, goal to lose 25 lbs in the next 6 months  Mediterranean diet discussed    Follow Up:   Return in about 6 months (around 9/6/2024) for recheck liver disease.    Jolynn Hayes PA-C  [facility] [place]  3/6/2024  17:02 EST    Dictated Utilizing Dragon Dictation: Part of this note may be an electronic transcription/translation of spoken language to printed text using the Dragon Dictation System.

## 2024-04-04 ENCOUNTER — TELEPHONE (OUTPATIENT)
Dept: UROLOGY | Facility: CLINIC | Age: 59
End: 2024-04-04
Payer: MEDICARE

## 2024-04-04 DIAGNOSIS — R79.89 LOW TESTOSTERONE IN MALE: ICD-10-CM

## 2024-04-04 RX ORDER — TESTOSTERONE CYPIONATE 200 MG/ML
100 INJECTION, SOLUTION INTRAMUSCULAR
Qty: 4 ML | Refills: 0 | Status: SHIPPED | OUTPATIENT
Start: 2024-04-04

## 2024-04-04 NOTE — TELEPHONE ENCOUNTER
Caller: Charly Braun    Relationship: Self    Best call back number: [phone]    Requested Prescriptions:   Requested Prescriptions     Pending Prescriptions Disp Refills    Testosterone Cypionate (DEPOTESTOTERONE CYPIONATE) 200 MG/ML injection 4 mL 0     Sig: Inject 0.5 mL into the appropriate muscle as directed by prescriber Every 7 (Seven) Days. Waste 0.5 mL from each vial.  With last injection request refills      PT ALSO NEEDS SYRINGE THAT DRAWS MED OUT WITH    Pharmacy where request should be sent:    [facility]# [phone]    Last office visit with prescribing clinician: 11/13/2023   Last telemedicine visit with prescribing clinician: Visit date not found   Next office visit with prescribing clinician: 5/13/2024     Additional details provided by patient: PT IS OUT OF MEDS    Does the patient have less than a 3 day supply:  [] Yes  [x] No    Would you like a call back once the refill request has been completed: [] Yes [x] No    If the office needs to give you a call back, can they leave a voicemail: [] Yes [x] No    Winnie Davis Rep   04/04/24 14:38 EDT

## 2024-05-02 ENCOUNTER — TELEPHONE (OUTPATIENT)
Dept: UROLOGY | Facility: CLINIC | Age: 59
End: 2024-05-02
Payer: MEDICARE

## 2024-05-02 DIAGNOSIS — R79.89 LOW TESTOSTERONE IN MALE: ICD-10-CM

## 2024-05-03 ENCOUNTER — TELEPHONE (OUTPATIENT)
Dept: UROLOGY | Facility: CLINIC | Age: 59
End: 2024-05-03
Payer: MEDICARE

## 2024-05-03 NOTE — TELEPHONE ENCOUNTER
Reminded pt of his labs before appointment and to bring results or have them fax to us.    Nan,CMA

## 2024-05-07 RX ORDER — ANASTROZOLE 1 MG/1
1 TABLET ORAL
Qty: 12 TABLET | Refills: 3 | Status: SHIPPED | OUTPATIENT
Start: 2024-05-07

## 2024-05-09 ENCOUNTER — HOSPITAL ENCOUNTER (OUTPATIENT)
Facility: HOSPITAL | Age: 59
Discharge: HOME | End: 2024-05-09
Payer: MEDICARE

## 2024-05-09 LAB
HCT VFR BLD AUTO: 49.1 % (ref 40–54)
HGB BLD-MCNC: 16.4 G/DL (ref 13–18)
PSA SERPL DL<=0.01 NG/ML-MCNC: 1.4 NG/ML (ref 0–4)

## 2024-05-09 PROCEDURE — 85018 HEMOGLOBIN: CPT

## 2024-05-09 PROCEDURE — 85014 HEMATOCRIT: CPT

## 2024-05-09 PROCEDURE — 84270 ASSAY OF SEX HORMONE GLOBUL: CPT

## 2024-05-09 PROCEDURE — 84403 ASSAY OF TOTAL TESTOSTERONE: CPT

## 2024-05-09 PROCEDURE — 36415 COLL VENOUS BLD VENIPUNCTURE: CPT

## 2024-05-09 PROCEDURE — 84153 ASSAY OF PSA TOTAL: CPT

## 2024-05-11 LAB
SHBG SERPL-SCNC: 20 NMOL/L (ref 19–76)
TESTOST FREE SERPL-MCNC: 193.8 PG/ML (ref 47–244)
TESTOST SERPL-MCNC: 686 NG/DL (ref 193–740)

## 2024-05-13 ENCOUNTER — HOSPITAL ENCOUNTER (OUTPATIENT)
Dept: MRI IMAGING | Facility: HOSPITAL | Age: 59
Discharge: HOME | End: 2024-05-13
Payer: MEDICARE

## 2024-05-13 ENCOUNTER — OFFICE VISIT (OUTPATIENT)
Dept: UROLOGY | Facility: CLINIC | Age: 59
End: 2024-05-13
Payer: MEDICARE

## 2024-05-13 VITALS
SYSTOLIC BLOOD PRESSURE: 130 MMHG | WEIGHT: 251 LBS | BODY MASS INDEX: 35.14 KG/M2 | DIASTOLIC BLOOD PRESSURE: 82 MMHG | HEIGHT: 71 IN

## 2024-05-13 DIAGNOSIS — G89.29 CHRONIC LOW BACK PAIN WITH LEFT-SIDED SCIATICA, UNSPECIFIED BACK PAIN LATERALITY: ICD-10-CM

## 2024-05-13 DIAGNOSIS — R79.89 LOW TESTOSTERONE IN MALE: Primary | ICD-10-CM

## 2024-05-13 DIAGNOSIS — M54.42 CHRONIC LOW BACK PAIN WITH LEFT-SIDED SCIATICA, UNSPECIFIED BACK PAIN LATERALITY: ICD-10-CM

## 2024-05-13 PROCEDURE — 72148 MRI LUMBAR SPINE W/O DYE: CPT

## 2024-05-13 RX ORDER — TESTOSTERONE CYPIONATE 200 MG/ML
100 INJECTION, SOLUTION INTRAMUSCULAR
Qty: 4 ML | Refills: 0 | Status: SHIPPED | OUTPATIENT
Start: 2024-05-13

## 2024-05-13 NOTE — PROGRESS NOTES
Office Visit Established Male Patient     Patient Name: Charly Braun  : 1965   MRN: [id number]     Chief Complaint:   Chief Complaint   Patient presents with    Low Testosterone       History of Present Illness: Mr. Charly Braun is a 58 y.o. male who presents today for follow up with hypogonadism.  He is doing well with testosterone, can really feel improvements in fatigue and ED about 3 days after injections   Has occasional slow starting stream in the morning but the rest of the day is normal        Subjective      Review of System:   As noted in HPI    Past Medical History:   Past Medical History:   Diagnosis Date    Acid reflux     Arthritis     Back pain     Degenerative disc disease at L5-S1 level     Depression     Essential hypertension 2019    GERD (gastroesophageal reflux disease)     Gout     Hyperlipidemia     Hypertension     Irregular heart beat        Past Surgical History:   Past Surgical History:   Procedure Laterality Date    CARDIAC CATHETERIZATION N/A 2019    Procedure: Left Heart Cath;  Surgeon: Karen Garcia MD;  Location:  [facility];  Service: Cardiology    COLONOSCOPY      Dr. Spring- normal    COLONOSCOPY N/A 2023    Procedure: COLONOSCOPY FOR SCREENING CPT CODE: , polypectomies;  Surgeon: Chapin Murillo MD;  Location: [facility] ENDOSCOPY;  Service: Gastroenterology;  Laterality: N/A;    COLONOSCOPY N/A 2024    Procedure: COLONOSCOPY with polypectomy;  Surgeon: Chapin Murillo MD;  Location: [facility] ENDOSCOPY;  Service: Gastroenterology;  Laterality: N/A;    GALLBLADDER SURGERY      SPINE SURGERY         Family History:   Family History   Problem Relation Age of Onset    Hypertension Mother     COPD Father     Colon polyps Father     Colon cancer Brother     Heart disease Brother        Social History:   Social History     Socioeconomic History    Marital status:    Tobacco Use    Smoking  "status: Never     Passive exposure: Never    Smokeless tobacco: Never   Vaping Use    Vaping status: Never Used   Substance and Sexual Activity    Alcohol use: No    Drug use: No    Sexual activity: Defer       Medications:     Current Outpatient Medications:     Needle, Disp, 18G X 1-1/2\" misc, Use for drawing up the medication, Disp: 50 each, Rfl: 3    Testosterone Cypionate (DEPOTESTOTERONE CYPIONATE) 200 MG/ML injection, Inject 0.5 mL into the appropriate muscle as directed by prescriber Every 7 (Seven) Days. Waste 0.5 mL from each vial.  With last injection request refills, Disp: 4 mL, Rfl: 0    albuterol sulfate HFA (Ventolin HFA) 108 (90 Base) MCG/ACT inhaler, Inhale 2 puffs Every 4 (Four) Hours As Needed for Wheezing or Shortness of Air., Disp: 18 g, Rfl: 5    allopurinol (ZYLOPRIM) 100 MG tablet, Take 1 tablet by mouth Daily., Disp: , Rfl:     amoxicillin-clavulanate (AUGMENTIN) 875-125 MG per tablet, Take 1 tablet by mouth Every 12 (Twelve) Hours., Disp: , Rfl:     anastrozole (ARIMIDEX) 1 MG tablet, Take 1 tablet by mouth Every 7 (Seven) Days., Disp: 12 tablet, Rfl: 3    atorvastatin (LIPITOR) 20 MG tablet, Take 1 tablet by mouth Daily., Disp: , Rfl:     B-D 3CC LUER-ZELALEM SYR 25GX1\" 25G X 1\" 3 ML misc, USE ONCE A WEEK, Disp: , Rfl:     bisoprolol (ZEBeta) 5 MG tablet, Take 1.5 tablets by mouth Daily., Disp: 135 tablet, Rfl: 3    cetirizine (zyrTEC) 10 MG tablet, Take 1 tablet by mouth Daily., Disp: , Rfl:     Cholecalciferol (Vitamin D3) 50 MCG (2000 UT) capsule, Take 1 capsule by mouth Daily., Disp: , Rfl:     FLUoxetine (PROzac) 40 MG capsule, Take 1 capsule by mouth Daily., Disp: , Rfl:     fluticasone (FLONASE) 50 MCG/ACT nasal spray, 2 sprays into the nostril(s) as directed by provider Daily., Disp: 16 g, Rfl: 5    Fluticasone Furoate-Vilanterol (Breo Ellipta) 200-25 MCG/ACT inhaler, Inhale 1 puff Daily., Disp: 60 each, Rfl: 2    HYDROcodone-acetaminophen (NORCO) 7.5-325 MG per tablet, Take 1 tablet " "by mouth Every 6 (Six) Hours As Needed for Moderate Pain., Disp: , Rfl:     ibuprofen (ADVIL,MOTRIN) 800 MG tablet, Take 1 tablet by mouth Every 6 (Six) Hours As Needed., Disp: , Rfl: 0    losartan (COZAAR) 25 MG tablet, Take 1 tablet by mouth Daily., Disp: , Rfl:     Needle, Disp, 23G X 1-1/2\" misc, 1 Device 1 (One) Time Per Week., Disp: 50 each, Rfl: 11    pantoprazole (PROTONIX) 40 MG EC tablet, Take 1 tablet by mouth Daily., Disp: 30 tablet, Rfl: 2    Syringe/Needle, Disp, (B-D 3CC LUER-ZELALEM SYR 25GX1/2\") 25G X 1-1/2\" 3 ML misc, Use 1 syringe weekly for injection, Disp: 50 each, Rfl: 11    vitamin B-12 (CYANOCOBALAMIN) 1000 MCG tablet, TAKE 1 TABLET BY MOUTH 1 TIME, Disp: , Rfl:     Allergies:   Allergies   Allergen Reactions    Tall Ragweed Irritability    Lisinopril Cough       Objective     Physical Exam:   Vital Signs:   Vitals:    05/13/24 0841   BP: 130/82   BP Location: Left arm   Patient Position: Sitting   Cuff Size: Adult   Weight: 114 kg (251 lb)   Height: 180.3 cm (70.98\")     Body mass index is 35.02 kg/m².     Physical Exam  Constitutional: NAD, WDWN.   Neurological: A + O x 3.   Psychiatric:  Normal mood and affect    Labs  Brief Urine Lab Results       None            Lab Results   Component Value Date    GLUCOSE 98 06/24/2019    CALCIUM 9.4 06/24/2019     06/24/2019    K 3.8 06/24/2019    CO2 24.0 06/24/2019     06/24/2019    BUN 14 06/24/2019    CREATININE 1.00 06/24/2019    EGFRIFNONA 79 06/24/2019    BCR 14.1 06/24/2019    ANIONGAP 15.0 06/24/2019       Lab Results   Component Value Date    WBC 5.10 06/24/2019    HGB 16.5 09/11/2023    HCT 49.6 09/11/2023    MCV 96.1 06/24/2019     06/24/2019            Radiographic Studies  No Images in the past 120 days found..    I have reviewed the above labs and imaging.     Assessment / Plan      Assessment/Plan:   Diagnoses and all orders for this visit:    1. Low testosterone in male (Primary)  -     Needle, Disp, 18G X 1-1/2\" misc; " Use for drawing up the medication  Dispense: 50 each; Refill: 3  -     Testosterone Cypionate (DEPOTESTOTERONE CYPIONATE) 200 MG/ML injection; Inject 0.5 mL into the appropriate muscle as directed by prescriber Every 7 (Seven) Days. Waste 0.5 mL from each vial.  With last injection request refills  Dispense: 4 mL; Refill: 0     59 yo male with low Testosterone here for regular follow up. He is doing well continues to feel well with injections, Labs are pending today but no anticipated dosage change is needed. Will continue with weekly injections and labs every 6 months. We discussed urinary symptoms and ED if he feels they become bothersome enough for treatment will consider daily Cialis.    Continue testosterone cypionate 100 mg weekly  Continue arimidex weekly  Obtain TT, estradiol, PSA, hematocrit, and hemoglobin 6 months    Follow Up:   Return in about 6 months (around 11/13/2024) for Follow up Melo.    MARGOT Vaughan,NP-C  [facility]

## 2024-08-02 DIAGNOSIS — R79.89 LOW TESTOSTERONE IN MALE: ICD-10-CM

## 2024-08-05 RX ORDER — TESTOSTERONE CYPIONATE 200 MG/ML
INJECTION, SOLUTION INTRAMUSCULAR
Qty: 4 ML | Refills: 0 | Status: SHIPPED | OUTPATIENT
Start: 2024-08-05

## 2024-08-08 ENCOUNTER — TELEPHONE (OUTPATIENT)
Dept: UROLOGY | Facility: CLINIC | Age: 59
End: 2024-08-08

## 2024-08-08 NOTE — TELEPHONE ENCOUNTER
Melo sent in prescription on 8/5 I let patient know he's calling pharmacy to see what's going on then since they confirmed it on 8/5 at 845am

## 2024-08-08 NOTE — TELEPHONE ENCOUNTER
Hub staff attempted to follow warm transfer process and was unsuccessful     Caller: Charly Braun    Relationship to patient: Self    Best call back number: [phone]    Patient is needing: PT'S PHARMACY LET HIM KNOW THAT THEY HAVE STILL NOT GOTTEN AUTH FROM OFFICE TO REFILL TESTOSTERONE. PLEASE SEND AUTH ASAP.    PLEASE CALL PATIENT BACK WHEN SENT, NO VM

## 2024-08-10 DIAGNOSIS — J45.40 MODERATE PERSISTENT ASTHMA WITHOUT COMPLICATION: ICD-10-CM

## 2024-08-12 RX ORDER — FLUTICASONE FUROATE AND VILANTEROL TRIFENATATE 200; 25 UG/1; UG/1
1 POWDER RESPIRATORY (INHALATION) DAILY
Qty: 60 EACH | Refills: 2 | OUTPATIENT
Start: 2024-08-12

## 2024-08-23 DIAGNOSIS — E66.01 MORBIDLY OBESE: ICD-10-CM

## 2024-08-26 RX ORDER — BISOPROLOL FUMARATE 5 MG/1
7.5 TABLET, FILM COATED ORAL DAILY
Qty: 135 TABLET | Refills: 3 | Status: SHIPPED | OUTPATIENT
Start: 2024-08-26

## 2024-09-11 ENCOUNTER — TELEPHONE (OUTPATIENT)
Dept: GASTROENTEROLOGY | Facility: CLINIC | Age: 59
End: 2024-09-11
Payer: MEDICARE

## 2024-09-11 NOTE — TELEPHONE ENCOUNTER
Called patient to follow-up on missed appointment; LVM.  If patient returns call, okay for [facility] to R/S appointment.

## 2024-10-02 DIAGNOSIS — R79.89 LOW TESTOSTERONE IN MALE: ICD-10-CM

## 2024-10-02 RX ORDER — TESTOSTERONE CYPIONATE 200 MG/ML
INJECTION, SOLUTION INTRAMUSCULAR
Qty: 4 ML | Refills: 0 | Status: SHIPPED | OUTPATIENT
Start: 2024-10-02

## 2024-10-17 ENCOUNTER — OFFICE VISIT (OUTPATIENT)
Dept: CARDIOLOGY | Facility: CLINIC | Age: 59
End: 2024-10-17
Payer: MEDICARE

## 2024-10-17 VITALS
HEIGHT: 72 IN | HEART RATE: 70 BPM | DIASTOLIC BLOOD PRESSURE: 80 MMHG | OXYGEN SATURATION: 96 % | WEIGHT: 245 LBS | BODY MASS INDEX: 33.18 KG/M2 | SYSTOLIC BLOOD PRESSURE: 128 MMHG

## 2024-10-17 DIAGNOSIS — I47.29 NSVT (NONSUSTAINED VENTRICULAR TACHYCARDIA): ICD-10-CM

## 2024-10-17 DIAGNOSIS — R00.2 PALPITATIONS: Primary | ICD-10-CM

## 2024-10-17 DIAGNOSIS — I10 ESSENTIAL HYPERTENSION: ICD-10-CM

## 2024-10-17 DIAGNOSIS — I49.3 PVC (PREMATURE VENTRICULAR CONTRACTION): ICD-10-CM

## 2024-10-17 NOTE — PROGRESS NOTES
"[facility]  Office Note  Charly Braun  1965  [address] [facility] [address]     VISIT DATE:  10/17/24    PCP: Марина Foster APRN  [address] [facility] [address]    CC: Palpitations  Chief Complaint   Patient presents with    Palpitations       PROBLEM LIST:    Palpitations:  48h Holter, 06/10/2019: Rare PAC's, occasional PVC's, one 6-beat run of VT.  Normal LHC, 06/24/2019.  Echo, 06/24/2019: EF 60%. Trace-to-mild MR/TR.  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  Depression       Allergies  Allergies   Allergen Reactions    Tall Ragweed Irritability    Lisinopril Cough       Current Medications    Current Outpatient Medications:     albuterol sulfate HFA (Ventolin HFA) 108 (90 Base) MCG/ACT inhaler, Inhale 2 puffs Every 4 (Four) Hours As Needed for Wheezing or Shortness of Air., Disp: 18 g, Rfl: 5    allopurinol (ZYLOPRIM) 100 MG tablet, Take 1 tablet by mouth Daily., Disp: , Rfl:     anastrozole (ARIMIDEX) 1 MG tablet, Take 1 tablet by mouth Every 7 (Seven) Days., Disp: 12 tablet, Rfl: 3    atorvastatin (LIPITOR) 20 MG tablet, Take 1 tablet by mouth Daily., Disp: , Rfl:     B-D 3CC LUER-ZELALEM SYR 25GX1\" 25G X 1\" 3 ML misc, USE ONCE A WEEK, Disp: , Rfl:     bisoprolol (ZEBeta) 5 MG tablet, TAKE 1 AND 1/2 TABLETS BY MOUTH DAILY, Disp: 135 tablet, Rfl: 3    Cholecalciferol (Vitamin D3) 50 MCG (2000 UT) capsule, Take 1 capsule by mouth Daily., Disp: , Rfl:     FLUoxetine (PROzac) 40 MG capsule, Take 1 capsule by mouth Daily., Disp: , Rfl:     fluticasone (FLONASE) 50 MCG/ACT nasal spray, 2 sprays into the nostril(s) as directed by provider Daily., Disp: 16 g, Rfl: 5    Fluticasone Furoate-Vilanterol (Breo Ellipta) 200-25 MCG/ACT inhaler, Inhale 1 puff Daily., Disp: 60 each, Rfl: 2    HYDROcodone-acetaminophen (NORCO) 7.5-325 MG per tablet, Take 1 tablet by mouth Every 6 (Six) Hours As Needed for Moderate Pain., Disp: , Rfl:     losartan (COZAAR) 25 MG " "tablet, Take 1 tablet by mouth Daily., Disp: , Rfl:     Needle, Disp, 18G X 1-1/2\" misc, Use for drawing up the medication, Disp: 50 each, Rfl: 3    Needle, Disp, 23G X 1-1/2\" misc, 1 Device 1 (One) Time Per Week., Disp: 50 each, Rfl: 11    pantoprazole (PROTONIX) 40 MG EC tablet, Take 1 tablet by mouth Daily., Disp: 30 tablet, Rfl: 2    Syringe/Needle, Disp, (B-D 3CC LUER-ZELALEM SYR 25GX1/2\") 25G X 1-1/2\" 3 ML misc, Use 1 syringe weekly for injection, Disp: 50 each, Rfl: 11    Testosterone Cypionate (DEPOTESTOTERONE CYPIONATE) 200 MG/ML injection, ADMINISTER 0.5 ML IN THE MUSCLE EVERY WEEK. DISCARD VIAL AFTER USE- REQUEST REFILL WITH LAST INJECTION, Disp: 4 mL, Rfl: 0    vitamin B-12 (CYANOCOBALAMIN) 1000 MCG tablet, TAKE 1 TABLET BY MOUTH 1 TIME, Disp: , Rfl:      History of Present Illness   Charly Braun is a 59 y.o. year old male who presents for consult from No ref. provider found for evaluation of patient's.  Patient states he been doing quite well overall.  Patient carefully has some heart racing episodes.  Patient denies any real symptoms with such.  States this happens 1-2 times a month.  Patient takes his medications as prescribed.  Blood pressure is controlled at home running 120s over 80s.  Patient has a singular headedness or dizziness.  No chest pain.  Patient stays active overall.  No shortness of breath.  Patient has no upcoming surgeries or procedures.  Patient otherwise denies complaints here today.    Pt denies any chest pain, dyspnea at rest, dyspnea on exertion, orthopnea, PND, palpitations, lower extremity edema, or claudication. Pt denies history of CHF, DVT, PE, MI, CVA, TIA, or rheumatic fever.     SOCIAL HX  Social History     Socioeconomic History    Marital status:    Tobacco Use    Smoking status: Never     Passive exposure: Never    Smokeless tobacco: Never   Vaping Use    Vaping status: Never Used   Substance and Sexual Activity    Alcohol use: No    Drug use: No    Sexual " "activity: Defer       FAMILY HX  Family History   Problem Relation Age of Onset    Hypertension Mother     COPD Father     Colon polyps Father     Colon cancer Brother     Heart disease Brother        Vitals:    10/17/24 0916   BP: 128/80   BP Location: Left arm   Patient Position: Sitting   Pulse: 70   SpO2: 96%   Weight: 111 kg (245 lb)   Height: 182.9 cm (72\")     Body mass index is 33.23 kg/m².     PHYSICAL EXAMINATION:  Physical Exam    Diagnostic Data:  Lab Results   Component Value Date    TRIG 181 (H) 06/24/2019    HDL 36 (L) 06/24/2019     Lab Results   Component Value Date    GLUCOSE 98 06/24/2019    BUN 14 06/24/2019    CREATININE 1.00 06/24/2019     06/24/2019    K 3.8 06/24/2019     06/24/2019    CO2 24.0 06/24/2019     Lab Results   Component Value Date    HGBA1C 5.40 06/24/2019     Lab Results   Component Value Date    WBC 5.10 06/24/2019    HGB 16.4 05/09/2024    HCT 49.1 05/09/2024     06/24/2019       Procedures    Advance Care Planning   ACP discussion was declined by the patient. Patient does not have an advance directive, declines further assistance.         ASSESSMENT:  There are no diagnoses linked to this encounter.    PLAN:    Current care at this time.  Patient doing well overall from a cardiac perspective  No changes in medical therapy today  Testing is up-to-date will see back in 1 year    Return in about 1 year (around 10/17/2025).     Tristin Galvan MD   "

## 2024-11-05 ENCOUNTER — TELEPHONE (OUTPATIENT)
Dept: UROLOGY | Facility: CLINIC | Age: 59
End: 2024-11-05
Payer: MEDICARE

## 2024-11-07 ENCOUNTER — HOSPITAL ENCOUNTER (OUTPATIENT)
Facility: HOSPITAL | Age: 59
Discharge: HOME | End: 2024-11-07
Payer: MEDICARE

## 2024-11-07 LAB
HCT VFR BLD AUTO: 48.1 % (ref 40–54)
HGB BLD-MCNC: 16.1 G/DL (ref 13–18)

## 2024-11-07 PROCEDURE — 82670 ASSAY OF TOTAL ESTRADIOL: CPT

## 2024-11-07 PROCEDURE — 84403 ASSAY OF TOTAL TESTOSTERONE: CPT

## 2024-11-07 PROCEDURE — 85014 HEMATOCRIT: CPT

## 2024-11-07 PROCEDURE — 85018 HEMOGLOBIN: CPT

## 2024-11-07 PROCEDURE — 36415 COLL VENOUS BLD VENIPUNCTURE: CPT

## 2024-11-07 PROCEDURE — 84270 ASSAY OF SEX HORMONE GLOBUL: CPT

## 2024-11-08 LAB — ESTRADIOL SERPL-MCNC: 16 PG/ML

## 2024-11-11 ENCOUNTER — OFFICE VISIT (OUTPATIENT)
Dept: UROLOGY | Facility: CLINIC | Age: 59
End: 2024-11-11
Payer: MEDICARE

## 2024-11-11 VITALS
SYSTOLIC BLOOD PRESSURE: 132 MMHG | HEART RATE: 59 BPM | RESPIRATION RATE: 16 BRPM | TEMPERATURE: 98.3 F | DIASTOLIC BLOOD PRESSURE: 78 MMHG | OXYGEN SATURATION: 99 %

## 2024-11-11 DIAGNOSIS — R79.89 LOW TESTOSTERONE IN MALE: Primary | ICD-10-CM

## 2024-11-11 DIAGNOSIS — N42.9 DISORDER OF PROSTATE: ICD-10-CM

## 2024-11-11 PROBLEM — M47.816 LUMBAR SPONDYLOSIS: Status: ACTIVE | Noted: 2023-10-18

## 2024-11-11 PROBLEM — M47.812 CERVICAL SPONDYLOSIS WITHOUT MYELOPATHY: Status: ACTIVE | Noted: 2024-08-14

## 2024-11-11 NOTE — PROGRESS NOTES
"       Office Visit Follow Up      Patient Name: Charly Braun  : 1965   MRN: [id number]     Chief Complaint:   Chief Complaint   Patient presents with    6 Months Testosterone Follow up/W Labs       Referring Provider: No ref. provider found    History of Present Illness: Charly Braun is a 59 y.o. male who presents today for follow up with hypogonadism  Doing well no problems with TRT      Subjective      Review of System:   As noted in HPI    Medications:     Current Outpatient Medications:     albuterol sulfate HFA (Ventolin HFA) 108 (90 Base) MCG/ACT inhaler, Inhale 2 puffs Every 4 (Four) Hours As Needed for Wheezing or Shortness of Air., Disp: 18 g, Rfl: 5    allopurinol (ZYLOPRIM) 100 MG tablet, Take 1 tablet by mouth Daily., Disp: , Rfl:     anastrozole (ARIMIDEX) 1 MG tablet, Take 1 tablet by mouth Every 7 (Seven) Days., Disp: 12 tablet, Rfl: 3    atorvastatin (LIPITOR) 20 MG tablet, Take 1 tablet by mouth Daily., Disp: , Rfl:     B-D 3CC LUER-ZELALEM SYR 25GX1\" 25G X 1\" 3 ML misc, USE ONCE A WEEK, Disp: , Rfl:     bisoprolol (ZEBeta) 5 MG tablet, TAKE 1 AND 1/2 TABLETS BY MOUTH DAILY, Disp: 135 tablet, Rfl: 3    Cholecalciferol (Vitamin D3) 50 MCG (2000 UT) capsule, Take 1 capsule by mouth Daily., Disp: , Rfl:     FLUoxetine (PROzac) 40 MG capsule, Take 1 capsule by mouth Daily., Disp: , Rfl:     fluticasone (FLONASE) 50 MCG/ACT nasal spray, 2 sprays into the nostril(s) as directed by provider Daily., Disp: 16 g, Rfl: 5    Fluticasone Furoate-Vilanterol (Breo Ellipta) 200-25 MCG/ACT inhaler, Inhale 1 puff Daily., Disp: 60 each, Rfl: 2    HYDROcodone-acetaminophen (NORCO) 7.5-325 MG per tablet, Take 1 tablet by mouth Every 6 (Six) Hours As Needed for Moderate Pain., Disp: , Rfl:     losartan (COZAAR) 25 MG tablet, Take 1 tablet by mouth Daily., Disp: , Rfl:     Needle, Disp, 18G X 1-1/2\" misc, Use for drawing up the medication, Disp: 50 each, Rfl: 3    Needle, Disp, 23G X 1-1/2\" misc, 1 Device 1 " "(One) Time Per Week., Disp: 50 each, Rfl: 11    pantoprazole (PROTONIX) 40 MG EC tablet, Take 1 tablet by mouth Daily., Disp: 30 tablet, Rfl: 2    Syringe/Needle, Disp, (B-D 3CC LUER-ZELALEM SYR 25GX1/2\") 25G X 1-1/2\" 3 ML misc, Use 1 syringe weekly for injection, Disp: 50 each, Rfl: 11    Testosterone Cypionate (DEPOTESTOTERONE CYPIONATE) 200 MG/ML injection, ADMINISTER 0.5 ML IN THE MUSCLE EVERY WEEK. DISCARD VIAL AFTER USE- REQUEST REFILL WITH LAST INJECTION, Disp: 4 mL, Rfl: 0    vitamin B-12 (CYANOCOBALAMIN) 1000 MCG tablet, TAKE 1 TABLET BY MOUTH 1 TIME, Disp: , Rfl:     Allergies:   Allergies   Allergen Reactions    Tall Ragweed Irritability    Lisinopril Cough       Objective     Physical Exam:   Vital Signs:   Vitals:    11/11/24 0844   BP: 132/78   Patient Position: Sitting   Pulse: 59   Resp: 16   Temp: 98.3 °F (36.8 °C)   SpO2: 99%     There is no height or weight on file to calculate BMI.     Physical Exam  Vitals and nursing note reviewed.   Constitutional:       General: He is not in acute distress.     Appearance: Normal appearance. He is overweight. He is not ill-appearing.   Pulmonary:      Effort: Pulmonary effort is normal. No respiratory distress.   Skin:     General: Skin is warm and dry.   Neurological:      General: No focal deficit present.      Mental Status: He is alert and oriented to person, place, and time.   Psychiatric:         Mood and Affect: Mood normal.         Behavior: Behavior normal.          Labs:   Brief Urine Lab Results       None                 Lab Results   Component Value Date    GLUCOSE 98 06/24/2019    CALCIUM 9.4 06/24/2019     06/24/2019    K 3.8 06/24/2019    CO2 24.0 06/24/2019     06/24/2019    BUN 14 06/24/2019    CREATININE 1.00 06/24/2019    EGFRIFNONA 79 06/24/2019    BCR 14.1 06/24/2019    ANIONGAP 15.0 06/24/2019       Lab Results   Component Value Date    WBC 5.10 06/24/2019    HGB 16.1 11/07/2024    HCT 48.1 11/07/2024    MCV 96.1 06/24/2019    "  06/24/2019       Images:   No Images in the past 120 days found..      Assessment / Plan      Assessment/Plan:   Diagnoses and all orders for this visit:    1. Low testosterone in male (Primary)  -     Hemoglobin & Hematocrit, Blood; Future  -     Testosterone; Future  -     PSA DIAGNOSTIC  -     Testosterone; Future    2. Disorder of prostate  -     PSA DIAGNOSTIC    60 yo male here for 6 month follow up with hypogonadism. He is doing well on TRT no symptoms. H&H are with in normal limits, estradiol is normal as well. Will continue with current regime and follow up in 6 months.     Total Testosterone:  lab didn't draw patient will obtain today  Estradiol:  16  Hematocrit:  48.1  Hemoglobin:  16.1    Continue testosterone cypionate 100 mg weekly  Obtain TT, PSA, hematocrit and hemoglobin 6 months    Follow Up:   Return in about 6 months (around 5/11/2025) for Follow up Shilpa.    MARGOT Vaughan, NP-C  [facility]

## 2024-11-13 LAB
SHBG SERPL-SCNC: 17 NMOL/L (ref 19–76)
TESTOST FREE SERPL-MCNC: 393.3 PG/ML (ref 47–244)
TESTOST SERPL-MCNC: 1257 NG/DL (ref 193–740)

## 2024-11-25 ENCOUNTER — TELEPHONE (OUTPATIENT)
Dept: UROLOGY | Facility: CLINIC | Age: 59
End: 2024-11-25

## 2024-11-25 NOTE — TELEPHONE ENCOUNTER
Caller: KACEY ALICIA    Relationship: SELF    Best call back number: [phone]    What orders are you requesting (i.e. lab or imaging): TESTOSTERONE    In what timeframe would the patient need to come in: ASAP    Where will you receive your lab/imaging services: [facility]    PT SAYS HE WENT TO GET LABS AND THEY DID EVERYTHING BUT TESTOSTERONE

## 2024-11-25 NOTE — TELEPHONE ENCOUNTER
Called patients and he lost the lab order is why they did not do testosterone I told him I will call [facility] and fax over the order     Olman CLARK

## 2024-11-26 ENCOUNTER — HOSPITAL ENCOUNTER (OUTPATIENT)
Facility: HOSPITAL | Age: 59
Discharge: HOME | End: 2024-11-26
Payer: MEDICARE

## 2024-11-26 PROCEDURE — 36415 COLL VENOUS BLD VENIPUNCTURE: CPT

## 2024-11-26 PROCEDURE — 84403 ASSAY OF TOTAL TESTOSTERONE: CPT

## 2024-11-29 LAB — TESTOST SERPL-MCNC: 530 NG/DL (ref 193–740)

## 2024-12-03 ENCOUNTER — TELEPHONE (OUTPATIENT)
Dept: UROLOGY | Facility: CLINIC | Age: 59
End: 2024-12-03
Payer: MEDICARE

## 2024-12-03 DIAGNOSIS — R79.89 LOW TESTOSTERONE IN MALE: ICD-10-CM

## 2024-12-03 RX ORDER — TESTOSTERONE CYPIONATE 200 MG/ML
100 INJECTION, SOLUTION INTRAMUSCULAR
Qty: 4 ML | Refills: 0 | Status: SHIPPED | OUTPATIENT
Start: 2024-12-03

## 2024-12-03 NOTE — TELEPHONE ENCOUNTER
Caller: KACEY ALICIA    Relationship: SELF    Best call back number: [phone]     Requested Prescriptions: TESTOSTERONE  Requested Prescriptions      No prescriptions requested or ordered in this encounter        Pharmacy where request should be sent:    [id number] - [place], [place] - [address] [facility] [address] [place] [address] - [phone]  - [phone] FX  [address] [place] [address]  Phone: [phone] Fax: [phone]    Last office visit with prescribing clinician: 11/11/2024   Last telemedicine visit with prescribing clinician: Visit date not found   Next office visit with prescribing clinician: 5/19/2025     Additional details provided by patient:     Does the patient have less than a 3 day supply:  [x] Yes  [] No    Would you like a call back once the refill request has been completed: [] Yes [x] No    If the office needs to give you a call back, can they leave a voicemail: [] Yes [] No    Winnie Yang Rep   12/03/24 11:31 EST

## 2025-01-08 DIAGNOSIS — R79.89 LOW TESTOSTERONE IN MALE: ICD-10-CM

## 2025-01-08 RX ORDER — SYRINGE WITH NEEDLE, 1 ML 25GX5/8"
SYRINGE, EMPTY DISPOSABLE MISCELLANEOUS
Qty: 50 EACH | Refills: 11 | Status: SHIPPED | OUTPATIENT
Start: 2025-01-08

## 2025-03-10 ENCOUNTER — TELEPHONE (OUTPATIENT)
Dept: UROLOGY | Facility: CLINIC | Age: 60
End: 2025-03-10
Payer: MEDICARE

## 2025-03-10 DIAGNOSIS — R79.89 LOW TESTOSTERONE IN MALE: ICD-10-CM

## 2025-03-10 NOTE — TELEPHONE ENCOUNTER
Caller: KACEY ALICIA    Relationship: SELF    Best call back number: [phone]    Requested Prescriptions: TESTOSTERONE  Requested Prescriptions      No prescriptions requested or ordered in this encounter        Pharmacy where request should be sent:      [id number] - [place], [place] - [address] [facility] [address] [place] - [phone]  - [phone] FX  [address] [place] [address]  Phone: [phone] Fax: [phone]    Last office visit with prescribing clinician: 11/11/2024   Last telemedicine visit with prescribing clinician: Visit date not found   Next office visit with prescribing clinician: 5/19/2025     Additional details provided by patient:     Does the patient have less than a 3 day supply:  [x] Yes  [] No    Would you like a call back once the refill request has been completed: [] Yes [x] No    If the office needs to give you a call back, can they leave a voicemail: [] Yes [x] No    Winnie Yang Rep   03/10/25 13:46 EDT

## 2025-03-13 RX ORDER — TESTOSTERONE CYPIONATE 200 MG/ML
100 INJECTION, SOLUTION INTRAMUSCULAR
Qty: 4 ML | Refills: 0 | Status: SHIPPED | OUTPATIENT
Start: 2025-03-13

## 2025-03-13 NOTE — TELEPHONE ENCOUNTER
Patient is compliant with TRT testosterone replacement policy     Last OV visit 11/11/2024          Last LABS 12/02/2024            Next scheduled OV visit 05/19/2025     Refill due 02/05/2025     Confirmed pharmacy [facility]

## 2025-05-19 ENCOUNTER — OFFICE VISIT (OUTPATIENT)
Dept: UROLOGY | Facility: CLINIC | Age: 60
End: 2025-05-19
Payer: MEDICARE

## 2025-05-19 ENCOUNTER — HOSPITAL ENCOUNTER (OUTPATIENT)
Facility: HOSPITAL | Age: 60
Discharge: HOME | End: 2025-05-19
Payer: MEDICARE

## 2025-05-19 VITALS
TEMPERATURE: 98.3 F | SYSTOLIC BLOOD PRESSURE: 134 MMHG | WEIGHT: 245 LBS | DIASTOLIC BLOOD PRESSURE: 86 MMHG | HEART RATE: 83 BPM | BODY MASS INDEX: 33.18 KG/M2 | HEIGHT: 72 IN | OXYGEN SATURATION: 99 %

## 2025-05-19 DIAGNOSIS — N42.9 DISORDER OF PROSTATE: ICD-10-CM

## 2025-05-19 DIAGNOSIS — R79.89 LOW TESTOSTERONE IN MALE: Primary | ICD-10-CM

## 2025-05-19 DIAGNOSIS — G47.33 OSA (OBSTRUCTIVE SLEEP APNEA): ICD-10-CM

## 2025-05-19 LAB
HCT VFR BLD AUTO: 49.4 % (ref 40–54)
HGB BLD-MCNC: 16.4 G/DL (ref 13–18)

## 2025-05-19 PROCEDURE — 85018 HEMOGLOBIN: CPT

## 2025-05-19 PROCEDURE — 1160F RVW MEDS BY RX/DR IN RCRD: CPT | Performed by: NURSE PRACTITIONER

## 2025-05-19 PROCEDURE — 85014 HEMATOCRIT: CPT

## 2025-05-19 PROCEDURE — 3079F DIAST BP 80-89 MM HG: CPT | Performed by: NURSE PRACTITIONER

## 2025-05-19 PROCEDURE — 3075F SYST BP GE 130 - 139MM HG: CPT | Performed by: NURSE PRACTITIONER

## 2025-05-19 PROCEDURE — G2211 COMPLEX E/M VISIT ADD ON: HCPCS | Performed by: NURSE PRACTITIONER

## 2025-05-19 PROCEDURE — 1159F MED LIST DOCD IN RCRD: CPT | Performed by: NURSE PRACTITIONER

## 2025-05-19 PROCEDURE — 99214 OFFICE O/P EST MOD 30 MIN: CPT | Performed by: NURSE PRACTITIONER

## 2025-05-19 PROCEDURE — 36415 COLL VENOUS BLD VENIPUNCTURE: CPT

## 2025-05-19 NOTE — PROGRESS NOTES
Office Visit     Patient Name: Charly Braun  : 1965   MRN: [id number]     Patient or patient representative verbalized consent for the use of Ambient Listening during the visit with  MARGOT Lewis for chart documentation. 2025  08:58 EDT    Chief Complaint:   Chief Complaint   Patient presents with    Follow-up     Low testosterone in male         Referring Provider: No ref. provider found    Primary Care Provider: Марина Foster APRN     History of Present Illness  The patient presents for testosterone evaluation.    Persistent Fatigue  - He reports well-being but persistent fatigue.    Snoring and Sleep Quality  - He acknowledges snoring and has undergone a sleep study but is not using his prescribed CPAP machine.  - Sleep quality varies; he fell asleep at 4:00 AM the previous night.    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    Supplemental information: No chest tenderness or swelling.    MEDICATIONS  Testosterone      Subjective   Review of System:   As noted in HPI.    Past Medical History:   Diagnosis Date    Acid reflux     Arthritis     Back pain     Degenerative disc disease at L5-S1 level     Depression     Essential hypertension 2019    GERD (gastroesophageal reflux disease)     Gout     Hyperlipidemia     Hypertension     Irregular heart beat      Past Surgical History:   Procedure Laterality Date    CARDIAC CATHETERIZATION N/A 2019    Procedure: Left Heart Cath;  Surgeon: Karen Garcia MD;  Location: [facility] CATH INVASIVE LOCATION;  Service: Cardiology    COLONOSCOPY      Dr. Spring- normal    COLONOSCOPY N/A 2023    Procedure: COLONOSCOPY FOR SCREENING CPT CODE: , polypectomies;  Surgeon: Chapin Murillo MD;  Location: [facility] ENDOSCOPY;  Service: Gastroenterology;  Laterality: N/A;    COLONOSCOPY N/A 2024  "   Procedure: COLONOSCOPY with polypectomy;  Surgeon: Chapin Murillo MD;  Location: [facility] ENDOSCOPY;  Service: Gastroenterology;  Laterality: N/A;    GALLBLADDER SURGERY      SPINE SURGERY       Family History   Problem Relation Age of Onset    Hypertension Mother     COPD Father     Colon polyps Father     Colon cancer Brother     Heart disease Brother      Social History     Socioeconomic History    Marital status:    Tobacco Use    Smoking status: Never     Passive exposure: Never    Smokeless tobacco: Never   Vaping Use    Vaping status: Never Used   Substance and Sexual Activity    Alcohol use: No    Drug use: No    Sexual activity: Defer       Current Outpatient Medications:     albuterol sulfate HFA (Ventolin HFA) 108 (90 Base) MCG/ACT inhaler, Inhale 2 puffs Every 4 (Four) Hours As Needed for Wheezing or Shortness of Air., Disp: 18 g, Rfl: 5    allopurinol (ZYLOPRIM) 100 MG tablet, Take 1 tablet by mouth Daily., Disp: , Rfl:     anastrozole (ARIMIDEX) 1 MG tablet, Take 1 tablet by mouth Every 7 (Seven) Days., Disp: 12 tablet, Rfl: 3    atorvastatin (LIPITOR) 20 MG tablet, Take 1 tablet by mouth Daily., Disp: , Rfl:     B-D 3CC LUER-ZELALEM SYR 25GX1\" 25G X 1\" 3 ML misc, USE ONCE A WEEK, Disp: , Rfl:     bisoprolol (ZEBeta) 5 MG tablet, TAKE 1 AND 1/2 TABLETS BY MOUTH DAILY, Disp: 135 tablet, Rfl: 3    Cholecalciferol (Vitamin D3) 50 MCG (2000 UT) capsule, Take 1 capsule by mouth Daily., Disp: , Rfl:     FLUoxetine (PROzac) 40 MG capsule, Take 1 capsule by mouth Daily., Disp: , Rfl:     fluticasone (FLONASE) 50 MCG/ACT nasal spray, 2 sprays into the nostril(s) as directed by provider Daily., Disp: 16 g, Rfl: 5    Fluticasone Furoate-Vilanterol (Breo Ellipta) 200-25 MCG/ACT inhaler, Inhale 1 puff Daily., Disp: 60 each, Rfl: 2    HYDROcodone-acetaminophen (NORCO) 7.5-325 MG per tablet, Take 1 tablet by mouth Every 6 (Six) Hours As Needed for Moderate Pain., Disp: , Rfl:     losartan (COZAAR) 25 MG " "tablet, Take 1 tablet by mouth Daily., Disp: , Rfl:     Needle, Disp, 18G X 1-1/2\" misc, Use for drawing up the medication, Disp: 50 each, Rfl: 3    Needle, Disp, 23G X 1-1/2\" misc, 1 Device 1 (One) Time Per Week., Disp: 50 each, Rfl: 11    pantoprazole (PROTONIX) 40 MG EC tablet, Take 1 tablet by mouth Daily., Disp: 30 tablet, Rfl: 2    Syringe/Needle, Disp, (B-D 3CC LUER-ZELALEM SYR 25GX1/2\") 25G X 1-1/2\" 3 ML misc, USE 1 SYRINGE EVERY WEEK, Disp: 50 each, Rfl: 11    Testosterone Cypionate (DEPOTESTOTERONE CYPIONATE) 200 MG/ML injection, Inject 0.5 mL into the appropriate muscle as directed by prescriber Every 7 (Seven) Days. Draw up 0.5 ml waste remainder of vial. Please call for refills with last injection, Disp: 4 mL, Rfl: 0    vitamin B-12 (CYANOCOBALAMIN) 1000 MCG tablet, TAKE 1 TABLET BY MOUTH 1 TIME, Disp: , Rfl:     Allergies   Allergen Reactions    Tall Ragweed Irritability    Lisinopril Cough     Objective   Visit Vitals  /86 (BP Location: Left arm, Patient Position: Sitting, Cuff Size: Adult)   Pulse 83   Temp 98.3 °F (36.8 °C) (Temporal)   Ht 182.9 cm (72\")   Wt 111 kg (245 lb)   SpO2 99%   BMI 33.23 kg/m²        Body mass index is 33.23 kg/m².     Physical Exam  Vitals and nursing note reviewed.   Constitutional:       General: He is not in acute distress.     Appearance: Normal appearance. He is obese. He is not ill-appearing.   Pulmonary:      Effort: Pulmonary effort is normal. No respiratory distress.   Skin:     General: Skin is warm and dry.   Neurological:      General: No focal deficit present.      Mental Status: He is alert and oriented to person, place, and time.   Psychiatric:         Mood and Affect: Mood normal.         Behavior: Behavior normal.          Labs  No results found for: \"COLORU\", \"CLARITYU\", \"SPECGRAV\", \"PHUR\", \"LEUKOCYTESUR\", \"NITRITE\", \"PROTEINPOCUA\", \"GLUCOSEUR\", \"KETONESU\", \"UROBILINOGEN\", \"BILIRUBINUR\", \"RBCUR\"   No results found for: \"WBCUA\", \"RBCUA\", \"BACTERIA\", " "\"HYALCASTU\", \"SQUAMEPIUA\"     Lab Results   Component Value Date    WBC 5.10 06/24/2019    HGB 16.1 11/07/2024    HCT 48.1 11/07/2024    MCV 96.1 06/24/2019     06/24/2019     Lab Results   Component Value Date    GLUCOSE 98 06/24/2019    CALCIUM 9.4 06/24/2019     06/24/2019    K 3.8 06/24/2019    CO2 24.0 06/24/2019     06/24/2019    BUN 14 06/24/2019    CREATININE 1.00 06/24/2019    CREATININE 0.99 06/24/2019    BCR 14.1 06/24/2019    ANIONGAP 15.0 06/24/2019    ALT 87 (H) 06/24/2019    AST 54 (H) 06/24/2019       Lab Results   Component Value Date    HGBA1C 5.40 06/24/2019        Lab Results   Component Value Date    PSA 1.40 05/09/2024    PSA 1.24 11/13/2023    PSA 1.280 11/11/2022       No results found for: \"URICACIDSTN\", \"ZGOY1VPVSKV\", \"FXEM7DPIIK\", \"LABMAGN\"  No results found for: \"OOKD09MK\", \"CAION\", \"PTH\", \"URICACID\"  No results found for: \"CYSTINE\", \"URINEVOLUM\", \"CALCIUMUR\", \"OXALATEU\", \"CITRATEUR\", \"LABPH\", \"URICUR\", \"NAUR\", \"KUR\", \"MAGNESIUMUR\", \"PHOSUR\", \"AMMONIUMUR\", \"CLUR\", \"GMIIGPBCZ39U\", \"UREAUR\", \"LABCREAUR\"    Lab Results   Component Value Date    TESTOSTEROTT 1,132 (H) 11/11/2022    TESTOSTEROTT 267 08/11/2022    TESTFRE 4.7 (L) 08/11/2022    PSA 1.40 05/09/2024    ESTRADIOL 60.9 (H) 11/11/2022       Lab Results   Component Value Date    SEXMONB 21.1 08/11/2022    TESTOSTEROTT 1,132 (H) 11/11/2022    TESTFRE 4.7 (L) 08/11/2022         Radiographic Studies  No Images in the past 120 days found..    I have reviewed the above labs and imaging.     Assessment / Plan      Diagnoses and all orders for this visit:    1. Low testosterone in male (Primary)  -     Hemoglobin & Hematocrit, Blood; Future  -     Testosterone; Future  -     Estradiol; Future  -     Hemoglobin & Hematocrit, Blood; Future  -     Testosterone; Future    2. Disorder of prostate  -     PSA DIAGNOSTIC; Future    3. BRYNN (obstructive sleep apnea)       Assessment & Plan  1. Testosterone management  - Testosterone " levels within therapeutic range, symptoms not due to testosterone deficiency  - Comprehensive lab workup (testosterone, hematocrit, hemoglobin, and PSA) to be conducted today  - Labs to be completed on the fourth day post-injection in 6 months, testosterone, estradiol, hematocrit and hemoglobin  - Continue testosterone cypionate 100 mg weekly    2. Disorder of  Prostate  - Ordered yearly PSA screen    3. OAS  - recommend patient follow up with specialist to discuss better compliance with CPAP device  - Consult sleep apnea specialist about Inspire device if CPAP mask is challenging    Follow-up in 6 months       Return in about 6 months (around 11/19/2025) for Jadon Lundberg, MSN, APRN, FNP-C  [facility]

## 2025-07-07 DIAGNOSIS — R79.89 LOW TESTOSTERONE IN MALE: ICD-10-CM

## 2025-07-08 RX ORDER — TESTOSTERONE CYPIONATE 200 MG/ML
100 INJECTION, SOLUTION INTRAMUSCULAR
Qty: 4 ML | Refills: 0 | Status: SHIPPED | OUTPATIENT
Start: 2025-07-08

## 2025-07-08 NOTE — TELEPHONE ENCOUNTER
Patient is compliant with TRT testosterone replacement policy     Last OV visit      05/19/2025     Last LABS        05/19/2025     Next scheduled OV visit 11/17/2025     Refill due 04/10/2025     Confirmed pharmacy [facility]

## 2025-07-29 DIAGNOSIS — E66.01 MORBIDLY OBESE: ICD-10-CM

## 2025-07-29 RX ORDER — BISOPROLOL FUMARATE 5 MG/1
7.5 TABLET, FILM COATED ORAL DAILY
Qty: 135 TABLET | Refills: 3 | Status: SHIPPED | OUTPATIENT
Start: 2025-07-29

## (undated) DEVICE — HYBRID TUBING/CAP SET FOR OLYMPUS® SCOPES: Brand: ERBE

## (undated) DEVICE — GW J TP FIX CORE .035 150

## (undated) DEVICE — SINGLE-USE POLYPECTOMY SNARE: Brand: CAPTIVATOR II

## (undated) DEVICE — ENDOSCOPY PORT CONNECTOR FOR OLYMPUS® SCOPES: Brand: ERBE

## (undated) DEVICE — PINNACLE INTRODUCER SHEATH: Brand: PINNACLE

## (undated) DEVICE — Device

## (undated) DEVICE — LUBE JELLY PK/2.75GM STRL BX/144

## (undated) DEVICE — KT MANIFOLD CATHLAB CUST

## (undated) DEVICE — VLV SXN AIR/H2O ORCAPOD3 1P/U STRL

## (undated) DEVICE — PATIENT RETURN ELECTRODE, SINGLE-USE, CONTACT QUALITY MONITORING, ADULT, WITH 9FT CORD, FOR PATIENTS WEIGING OVER 33LBS. (15KG): Brand: MEGADYNE

## (undated) DEVICE — QUICK CATCH IN-LINE SUCTION POLYP TRAP IS USED FOR SUCTION RETRIEVAL OF ENDOSCOPICALLY REMOVED POLYPS.

## (undated) DEVICE — CATH DIAG EXPO .056 FL3.5 6F 100CM

## (undated) DEVICE — PK CATH CARD 10

## (undated) DEVICE — INTRO SHEATH PRELUDE IDEAL SPRNG COIL .021 6F 16X80CM

## (undated) DEVICE — SNAR POLYP SENSATION STDOVL 27 240 BX40

## (undated) DEVICE — CATH DIAG EXPO M/ PK 6FR FL4/FR4 PIG 3PK